# Patient Record
Sex: MALE | Race: WHITE | NOT HISPANIC OR LATINO | Employment: FULL TIME | URBAN - METROPOLITAN AREA
[De-identification: names, ages, dates, MRNs, and addresses within clinical notes are randomized per-mention and may not be internally consistent; named-entity substitution may affect disease eponyms.]

---

## 2023-07-31 ENCOUNTER — OFFICE VISIT (OUTPATIENT)
Dept: PRIMARY CARE | Facility: CLINIC | Age: 68
End: 2023-07-31
Payer: COMMERCIAL

## 2023-07-31 VITALS
BODY MASS INDEX: 32.43 KG/M2 | OXYGEN SATURATION: 96 % | HEART RATE: 81 BPM | DIASTOLIC BLOOD PRESSURE: 80 MMHG | WEIGHT: 214 LBS | HEIGHT: 68 IN | TEMPERATURE: 96.7 F | SYSTOLIC BLOOD PRESSURE: 119 MMHG

## 2023-07-31 DIAGNOSIS — I65.23 BILATERAL CAROTID ARTERY STENOSIS: ICD-10-CM

## 2023-07-31 DIAGNOSIS — E78.2 MIXED HYPERLIPIDEMIA: ICD-10-CM

## 2023-07-31 DIAGNOSIS — E11.59 TYPE 2 DIABETES MELLITUS WITH OTHER CIRCULATORY COMPLICATION, WITHOUT LONG-TERM CURRENT USE OF INSULIN (MULTI): Primary | Chronic | ICD-10-CM

## 2023-07-31 PROBLEM — I25.10 3-VESSEL CAD: Status: ACTIVE | Noted: 2023-07-31

## 2023-07-31 PROBLEM — I10 BENIGN ESSENTIAL HYPERTENSION: Status: ACTIVE | Noted: 2023-07-31

## 2023-07-31 PROBLEM — I77.9 CAROTID ARTERY DISEASE (CMS-HCC): Status: ACTIVE | Noted: 2023-07-31

## 2023-07-31 PROCEDURE — 1159F MED LIST DOCD IN RCRD: CPT | Performed by: FAMILY MEDICINE

## 2023-07-31 PROCEDURE — 3079F DIAST BP 80-89 MM HG: CPT | Performed by: FAMILY MEDICINE

## 2023-07-31 PROCEDURE — 3074F SYST BP LT 130 MM HG: CPT | Performed by: FAMILY MEDICINE

## 2023-07-31 PROCEDURE — 1160F RVW MEDS BY RX/DR IN RCRD: CPT | Performed by: FAMILY MEDICINE

## 2023-07-31 PROCEDURE — 1036F TOBACCO NON-USER: CPT | Performed by: FAMILY MEDICINE

## 2023-07-31 PROCEDURE — 4010F ACE/ARB THERAPY RXD/TAKEN: CPT | Performed by: FAMILY MEDICINE

## 2023-07-31 PROCEDURE — 99214 OFFICE O/P EST MOD 30 MIN: CPT | Performed by: FAMILY MEDICINE

## 2023-07-31 RX ORDER — OMEPRAZOLE 40 MG/1
CAPSULE, DELAYED RELEASE ORAL
COMMUNITY
Start: 2017-02-14 | End: 2023-09-05 | Stop reason: SDUPTHER

## 2023-07-31 RX ORDER — MELOXICAM 15 MG/1
TABLET ORAL
COMMUNITY
Start: 2015-04-28 | End: 2023-09-15 | Stop reason: SDUPTHER

## 2023-07-31 RX ORDER — CHLORTHALIDONE 25 MG/1
1 TABLET ORAL DAILY
COMMUNITY
Start: 2015-06-24 | End: 2024-03-19 | Stop reason: SDUPTHER

## 2023-07-31 RX ORDER — TELMISARTAN 80 MG/1
80 TABLET ORAL DAILY
COMMUNITY
Start: 2015-06-24 | End: 2024-04-16 | Stop reason: SDUPTHER

## 2023-07-31 RX ORDER — TRIAMCINOLONE ACETONIDE 5 MG/G
CREAM TOPICAL
COMMUNITY
Start: 2018-09-05 | End: 2023-11-15 | Stop reason: ALTCHOICE

## 2023-07-31 RX ORDER — MULTIVIT WITH IRON,MINERALS
1 TABLET,CHEWABLE ORAL 2 TIMES DAILY
COMMUNITY

## 2023-07-31 RX ORDER — ATORVASTATIN CALCIUM 80 MG/1
80 TABLET, FILM COATED ORAL DAILY
COMMUNITY
Start: 2015-04-28 | End: 2024-03-19 | Stop reason: SDUPTHER

## 2023-07-31 RX ORDER — CLOPIDOGREL BISULFATE 75 MG/1
1 TABLET ORAL DAILY
COMMUNITY
Start: 2022-04-12 | End: 2023-10-24 | Stop reason: SINTOL

## 2023-07-31 RX ORDER — SEMAGLUTIDE 0.68 MG/ML
0.5 INJECTION, SOLUTION SUBCUTANEOUS
Qty: 9 ML | Refills: 1 | Status: SHIPPED | OUTPATIENT
Start: 2023-07-31 | End: 2023-11-15 | Stop reason: SDUPTHER

## 2023-07-31 RX ORDER — SEMAGLUTIDE 0.68 MG/ML
INJECTION, SOLUTION SUBCUTANEOUS
COMMUNITY
Start: 2023-06-17 | End: 2023-07-31 | Stop reason: SDUPTHER

## 2023-07-31 RX ORDER — METFORMIN HYDROCHLORIDE 1000 MG/1
1000 TABLET ORAL 2 TIMES DAILY
COMMUNITY
Start: 2017-01-16 | End: 2024-03-19 | Stop reason: SDUPTHER

## 2023-07-31 RX ORDER — ICOSAPENT ETHYL 1 G/1
2 CAPSULE ORAL
Qty: 360 CAPSULE | Refills: 1 | Status: SHIPPED | OUTPATIENT
Start: 2023-07-31 | End: 2023-11-15

## 2023-07-31 RX ORDER — ASPIRIN 81 MG/1
1 TABLET ORAL DAILY
COMMUNITY
Start: 2012-08-15

## 2023-07-31 RX ORDER — CHOLECALCIFEROL (VITAMIN D3) 50 MCG
TABLET ORAL
COMMUNITY

## 2023-07-31 RX ORDER — NAPROXEN SODIUM 220 MG/1
1 TABLET ORAL 2 TIMES DAILY
COMMUNITY

## 2023-07-31 ASSESSMENT — LIFESTYLE VARIABLES
HOW OFTEN DO YOU HAVE SIX OR MORE DRINKS ON ONE OCCASION: NEVER
AUDIT-C TOTAL SCORE: 0
SKIP TO QUESTIONS 9-10: 1
HOW MANY STANDARD DRINKS CONTAINING ALCOHOL DO YOU HAVE ON A TYPICAL DAY: PATIENT DOES NOT DRINK
HOW OFTEN DO YOU HAVE A DRINK CONTAINING ALCOHOL: NEVER

## 2023-07-31 ASSESSMENT — PATIENT HEALTH QUESTIONNAIRE - PHQ9
SUM OF ALL RESPONSES TO PHQ9 QUESTIONS 1 & 2: 0
2. FEELING DOWN, DEPRESSED OR HOPELESS: NOT AT ALL
1. LITTLE INTEREST OR PLEASURE IN DOING THINGS: NOT AT ALL

## 2023-07-31 NOTE — PROGRESS NOTES
"Here for fu visit re: carotid stenosisi DM HLD    compliant with meds     tolerating meds     monitoring BP at home : BP ,130/80     denies chest pain SOB DOOLEY diaphoresis nausea palpitations syncope presyncope orthopnea edema leg pain dysarthria aphasia focal weakness headache numbness tingling  visual disturbance gait disturbance polydipsia polyuria weight loss tremor epistaxis melena rectal bleeding tremor fatigue weight change temperature intolerance.        /80   Pulse 81   Temp 35.9 °C (96.7 °F)   Ht 1.727 m (5' 8\")   Wt 97.1 kg (214 lb)   SpO2 96%   BMI 32.54 kg/m²       Appears comfortable  No retractions  Skin without pallor petechia icterus cyanosis  Neck without JVD thyromegaly bruits  Chest clear to auscultation without rales rhonchi wheeze  Heart regular rate and rhythm without murmur  Abdomen soft nondistended nontender without organomegaly or mass  Extremities without erythema edema Homans or cord  Peripheral pulses palpable  Neuro intact  feet warm without pallor ulcerations erythema  Dorsalis pedis pulses palpable  Sensate to microfilament bilaterally        "

## 2023-08-09 ENCOUNTER — TELEPHONE (OUTPATIENT)
Dept: PRIMARY CARE | Facility: CLINIC | Age: 68
End: 2023-08-09

## 2023-08-09 NOTE — TELEPHONE ENCOUNTER
Patient called saying that [facility] told him he needs a prior authorization for his icosapent ethyl 1 gram capsule.

## 2023-08-15 RX ORDER — OMEPRAZOLE 40 MG/1
40 CAPSULE, DELAYED RELEASE ORAL
Qty: 90 CAPSULE | Refills: 1 | Status: CANCELLED | OUTPATIENT
Start: 2023-08-15

## 2023-08-29 PROBLEM — M19.90 OSTEOARTHRITIS: Status: ACTIVE | Noted: 2023-08-29

## 2023-08-31 ENCOUNTER — TELEPHONE (OUTPATIENT)
Dept: PRIMARY CARE | Facility: CLINIC | Age: 68
End: 2023-08-31
Payer: COMMERCIAL

## 2023-08-31 NOTE — TELEPHONE ENCOUNTER
Just making sure this patients meloxicam med was called in, we spoke about it on 08/29. He said he takes it for Osteoarthritis.

## 2023-09-01 NOTE — TELEPHONE ENCOUNTER
Rx Refill Request Telephone Encounter    Name:  Calvin Figueroa  :  [id number]  Medication Name:  Omeprazole 40 mg capsule  Dose :    Route : oral  Frequency :    Quantity :    Directions : Take by mouth  Specific Pharmacy location:  [facility]   Date of last appointment:  23  Date of next appointment:  11/15/23  Best number to reach patient:

## 2023-09-05 DIAGNOSIS — K21.9 GASTROESOPHAGEAL REFLUX DISEASE WITHOUT ESOPHAGITIS: Primary | ICD-10-CM

## 2023-09-05 RX ORDER — OMEPRAZOLE 40 MG/1
40 CAPSULE, DELAYED RELEASE ORAL
Qty: 90 CAPSULE | Refills: 0 | Status: SHIPPED | OUTPATIENT
Start: 2023-09-05 | End: 2023-09-15 | Stop reason: SDUPTHER

## 2023-09-15 DIAGNOSIS — M19.90 OSTEOARTHRITIS, UNSPECIFIED OSTEOARTHRITIS TYPE, UNSPECIFIED SITE: Primary | ICD-10-CM

## 2023-09-15 DIAGNOSIS — K21.9 GASTROESOPHAGEAL REFLUX DISEASE WITHOUT ESOPHAGITIS: ICD-10-CM

## 2023-09-15 DIAGNOSIS — M19.90 OSTEOARTHRITIS, UNSPECIFIED OSTEOARTHRITIS TYPE, UNSPECIFIED SITE: ICD-10-CM

## 2023-09-15 RX ORDER — MELOXICAM 15 MG/1
15 TABLET ORAL DAILY
Qty: 90 TABLET | Refills: 0 | Status: SHIPPED | OUTPATIENT
Start: 2023-09-15 | End: 2023-11-27

## 2023-09-15 RX ORDER — OMEPRAZOLE 40 MG/1
40 CAPSULE, DELAYED RELEASE ORAL
Qty: 90 CAPSULE | Refills: 0 | Status: SHIPPED | OUTPATIENT
Start: 2023-09-15 | End: 2024-04-16 | Stop reason: SDUPTHER

## 2023-09-15 RX ORDER — MELOXICAM 15 MG/1
15 TABLET ORAL DAILY
Qty: 90 TABLET | Refills: 0 | Status: SHIPPED | OUTPATIENT
Start: 2023-09-15 | End: 2023-09-15 | Stop reason: SDUPTHER

## 2023-09-15 NOTE — TELEPHONE ENCOUNTER
Rx Refill Request Telephone Encounter    Name:  Calvin Figueroa  :  [id number]  Medication Name:  Meloxicam   Dose : 15 mg   Route :    Frequency : 1 a day    Quantity : 90  Directions :    Specific Pharmacy location:  [facility]   Date of last appointment:  23  Date of next appointment:    Best number to reach patient:  [phone]      Rx Refill Request Telephone Encounter    Name:  Calvin Figueroa  :  [id number]  Medication Name:  Omeprazole  Dose : 40 mg   Route :    Frequency : 1 caps by mouth once daily  Quantity : 90  Directions :    Specific Pharmacy location:  [facility]   Date of last appointment:  23   Date of next appointment:    Best number to reach patient:  [phone]

## 2023-09-24 PROBLEM — E11.40 DIABETIC NEUROPATHY (MULTI): Status: ACTIVE | Noted: 2023-09-24

## 2023-09-24 PROBLEM — R53.82 CHRONIC FATIGUE: Status: ACTIVE | Noted: 2023-09-24

## 2023-09-24 PROBLEM — L30.9 ECZEMA: Status: ACTIVE | Noted: 2023-09-24

## 2023-09-24 PROBLEM — E03.9 HYPOTHYROIDISM: Status: ACTIVE | Noted: 2023-09-24

## 2023-09-24 PROBLEM — M23.303 DERANGEMENT OF MEDIAL MENISCUS OF RIGHT KNEE: Status: ACTIVE | Noted: 2023-09-24

## 2023-09-24 PROBLEM — I65.23 ARTERIOSCLEROSIS OF CAROTID ARTERY, BILATERAL: Status: ACTIVE | Noted: 2023-09-24

## 2023-09-24 PROBLEM — E66.811 CLASS 1 OBESITY WITH BODY MASS INDEX (BMI) OF 32.0 TO 32.9 IN ADULT: Status: ACTIVE | Noted: 2023-09-24

## 2023-09-24 PROBLEM — E66.9 CLASS 1 OBESITY WITH BODY MASS INDEX (BMI) OF 32.0 TO 32.9 IN ADULT: Status: ACTIVE | Noted: 2023-09-24

## 2023-09-24 PROBLEM — R06.09 EXERTIONAL DYSPNEA: Status: ACTIVE | Noted: 2023-09-24

## 2023-09-24 PROBLEM — G89.29 CHRONIC LEFT HIP PAIN: Status: ACTIVE | Noted: 2023-09-24

## 2023-09-24 PROBLEM — H91.90 DECREASED HEARING: Status: ACTIVE | Noted: 2023-09-24

## 2023-09-24 PROBLEM — H93.13 TINNITUS OF BOTH EARS: Status: ACTIVE | Noted: 2023-09-24

## 2023-09-24 PROBLEM — M25.552 CHRONIC LEFT HIP PAIN: Status: ACTIVE | Noted: 2023-09-24

## 2023-09-24 PROBLEM — H90.3 HEARING LOSS, SENSORINEURAL, ASYMMETRICAL: Status: ACTIVE | Noted: 2023-09-24

## 2023-09-24 RX ORDER — IBUPROFEN 200 MG
CAPSULE ORAL
COMMUNITY
Start: 2020-03-18

## 2023-09-24 RX ORDER — NIACIN 500 MG/1
1 TABLET, EXTENDED RELEASE ORAL DAILY
COMMUNITY
Start: 2015-06-12 | End: 2023-11-15 | Stop reason: ALTCHOICE

## 2023-09-24 RX ORDER — DOCUSATE SODIUM 100 MG/1
100 CAPSULE, LIQUID FILLED ORAL 2 TIMES DAILY PRN
COMMUNITY
Start: 2010-12-10 | End: 2023-11-15 | Stop reason: ALTCHOICE

## 2023-09-24 RX ORDER — FENOFIBRATE 54 MG/1
TABLET ORAL
COMMUNITY
Start: 2013-02-19 | End: 2023-11-15 | Stop reason: ALTCHOICE

## 2023-09-24 RX ORDER — NIACIN 1000 MG
TABLET, EXTENDED RELEASE ORAL
COMMUNITY
Start: 2014-11-14 | End: 2023-11-15 | Stop reason: ALTCHOICE

## 2023-09-24 RX ORDER — MECLIZINE HCL 12.5 MG 12.5 MG/1
TABLET ORAL 3 TIMES DAILY PRN
COMMUNITY
Start: 2022-03-10

## 2023-09-24 RX ORDER — OMEPRAZOLE 20 MG/1
20 CAPSULE, DELAYED RELEASE ORAL DAILY
COMMUNITY
Start: 2013-07-08 | End: 2024-04-16 | Stop reason: WASHOUT

## 2023-09-24 RX ORDER — ZINC GLUCONATE 50 MG
1 TABLET ORAL DAILY
COMMUNITY
End: 2023-11-15 | Stop reason: ALTCHOICE

## 2023-09-24 RX ORDER — FAMOTIDINE 20 MG/1
20 TABLET, FILM COATED ORAL NIGHTLY PRN
COMMUNITY
Start: 2022-02-25 | End: 2023-11-15 | Stop reason: ALTCHOICE

## 2023-09-24 RX ORDER — TRAMADOL HYDROCHLORIDE 50 MG/1
50 TABLET ORAL EVERY 6 HOURS PRN
COMMUNITY
Start: 2010-12-09 | End: 2023-11-15 | Stop reason: ALTCHOICE

## 2023-09-24 RX ORDER — LANCETS
EACH MISCELLANEOUS
COMMUNITY

## 2023-09-24 RX ORDER — LEVOTHYROXINE SODIUM 75 UG/1
75 TABLET ORAL DAILY
COMMUNITY
End: 2023-11-15 | Stop reason: ALTCHOICE

## 2023-09-24 RX ORDER — DIAPER,BRIEF,ADULT, DISPOSABLE
1 EACH MISCELLANEOUS DAILY
COMMUNITY

## 2023-10-17 ENCOUNTER — HOSPITAL ENCOUNTER (OUTPATIENT)
Dept: VASCULAR MEDICINE | Facility: HOSPITAL | Age: 68
Discharge: HOME | End: 2023-10-17
Payer: COMMERCIAL

## 2023-10-17 DIAGNOSIS — I65.23 OCCLUSION AND STENOSIS OF BILATERAL CAROTID ARTERIES: ICD-10-CM

## 2023-10-17 PROCEDURE — 93880 EXTRACRANIAL BILAT STUDY: CPT | Performed by: SURGERY

## 2023-10-17 PROCEDURE — 93880 EXTRACRANIAL BILAT STUDY: CPT

## 2023-10-24 ENCOUNTER — OFFICE VISIT (OUTPATIENT)
Dept: VASCULAR SURGERY | Facility: CLINIC | Age: 68
End: 2023-10-24
Payer: COMMERCIAL

## 2023-10-24 VITALS
HEIGHT: 68 IN | WEIGHT: 208 LBS | DIASTOLIC BLOOD PRESSURE: 68 MMHG | OXYGEN SATURATION: 95 % | BODY MASS INDEX: 31.52 KG/M2 | HEART RATE: 83 BPM | SYSTOLIC BLOOD PRESSURE: 120 MMHG

## 2023-10-24 DIAGNOSIS — I65.23 BILATERAL CAROTID ARTERY STENOSIS: ICD-10-CM

## 2023-10-24 PROCEDURE — 3074F SYST BP LT 130 MM HG: CPT | Performed by: SURGERY

## 2023-10-24 PROCEDURE — 4010F ACE/ARB THERAPY RXD/TAKEN: CPT | Performed by: SURGERY

## 2023-10-24 PROCEDURE — 3078F DIAST BP <80 MM HG: CPT | Performed by: SURGERY

## 2023-10-24 PROCEDURE — 1036F TOBACCO NON-USER: CPT | Performed by: SURGERY

## 2023-10-24 PROCEDURE — 1160F RVW MEDS BY RX/DR IN RCRD: CPT | Performed by: SURGERY

## 2023-10-24 PROCEDURE — 99213 OFFICE O/P EST LOW 20 MIN: CPT | Performed by: SURGERY

## 2023-10-24 PROCEDURE — 1159F MED LIST DOCD IN RCRD: CPT | Performed by: SURGERY

## 2023-10-24 ASSESSMENT — ENCOUNTER SYMPTOMS
ENDOCRINE NEGATIVE: 1
CONSTITUTIONAL NEGATIVE: 1
NEUROLOGICAL NEGATIVE: 1
PSYCHIATRIC NEGATIVE: 1
BRUISES/BLEEDS EASILY: 1
RESPIRATORY NEGATIVE: 1
CARDIOVASCULAR NEGATIVE: 1
ALLERGIC/IMMUNOLOGIC NEGATIVE: 1
GASTROINTESTINAL NEGATIVE: 1
MUSCULOSKELETAL NEGATIVE: 1

## 2023-10-24 NOTE — PROGRESS NOTES
History Of Present Illness  Calvin Figueroa is a 68 y.o. male presenting for carotid follow up. He has history of left CEA 1 1/2 years ago. He denies any new lateralizing symptoms since his last visit. He continues to take aspirin 3x a week, along with Plavix and atorvastatin daily. He states he only takes the aspirin 3x a week due to significant issues with bruising and bleeding. His most recent carotid duplex reveals less than 50% ICA stenosis bilaterally.     Past Medical History  He has a past medical history of Chronic fatigue, unspecified (05/12/2022), Hypothyroidism, unspecified (05/18/2022), Personal history of other diseases of the circulatory system, Personal history of other diseases of the digestive system (01/22/2016), and Unspecified hearing loss, unspecified ear (01/28/2022).    Surgical History  He has a past surgical history that includes MR angio head wo IV contrast (3/9/2022); MR angio neck wo IV contrast (3/9/2022); CT angio head w and wo IV contrast (3/9/2022); CT angio neck w and wo IV contrast (3/9/2022); CT angio head w and wo IV contrast (10/21/2022); and CT angio neck w and wo IV contrast (10/21/2022).     Social History  He reports that he has never smoked. He has never used smokeless tobacco. No history on file for alcohol use and drug use.    Family History  Family History   Problem Relation Name Age of Onset    Heart disease Mother      Diabetes Mother      Hypertension Mother      Hyperlipidemia Father      Cancer Father      Hypertension Father      Rectal cancer Paternal Grandmother      Other (Cardiac disorder) Other Other     Diabetes Other Other     Hypertension Other Other     Lung cancer Other Other         Allergies  Patient has no known allergies.    Review of Systems   Constitutional: Negative.    HENT: Negative.     Respiratory: Negative.     Cardiovascular: Negative.    Gastrointestinal: Negative.    Endocrine: Negative.    Genitourinary: Negative.    Musculoskeletal:  "Negative.    Allergic/Immunologic: Negative.    Neurological: Negative.    Hematological:  Bruises/bleeds easily.   Psychiatric/Behavioral: Negative.          Physical Exam  Vitals reviewed.   Constitutional:       General: He is not in acute distress.     Appearance: Normal appearance. He is normal weight.   HENT:      Head: Normocephalic and atraumatic.   Eyes:      Extraocular Movements: Extraocular movements intact.      Conjunctiva/sclera: Conjunctivae normal.      Pupils: Pupils are equal, round, and reactive to light.   Neck:      Vascular: No carotid bruit.   Cardiovascular:      Rate and Rhythm: Normal rate and regular rhythm.      Pulses:           Radial pulses are 2+ on the right side and 2+ on the left side.      Heart sounds: Normal heart sounds.   Pulmonary:      Effort: Pulmonary effort is normal.      Breath sounds: Normal breath sounds.   Abdominal:      General: Abdomen is flat. Bowel sounds are normal.      Palpations: Abdomen is soft.   Musculoskeletal:         General: No swelling or tenderness. Normal range of motion.      Cervical back: Normal range of motion and neck supple. No tenderness.   Skin:     General: Skin is warm and dry.      Capillary Refill: Capillary refill takes less than 2 seconds.   Neurological:      General: No focal deficit present.      Mental Status: He is alert and oriented to person, place, and time.      Cranial Nerves: No cranial nerve deficit.      Sensory: No sensory deficit.      Motor: No weakness.   Psychiatric:         Mood and Affect: Mood normal.         Behavior: Behavior normal.          Last Recorded Vitals  Blood pressure 120/68, pulse 83, height 1.727 m (5' 8\"), weight 94.3 kg (208 lb), SpO2 95 %.    Relevant Results      Current Outpatient Medications:     aspirin 81 mg EC tablet, Take 1 tablet (81 mg) by mouth once daily., Disp: , Rfl:     atorvastatin (Lipitor) 80 mg tablet, Take 1 tablet (80 mg) by mouth once daily., Disp: , Rfl:     blood sugar " diagnostic (Blood Glucose Test) strip, livongo -uses there test strips and lancets and meter., Disp: , Rfl:     chlorthalidone (Hygroton) 25 mg tablet, Take 1 tablet (25 mg) by mouth once daily., Disp: , Rfl:     cholecalciferol (Vitamin D-3) 50 MCG (2000 UT) tablet, Take by mouth., Disp: , Rfl:     docusate sodium (Colace) 100 mg capsule, Take 1 capsule (100 mg) by mouth 2 times a day as needed for constipation (over the counter)., Disp: , Rfl:     empagliflozin (Jardiance) 10 mg, Take 1 tablet (10 mg) by mouth once daily., Disp: , Rfl:     evolocumab (Repatha SureClick) 140 mg/mL injection, Inject 1 mL (140 mg) under the skin every 14 (fourteen) days., Disp: , Rfl:     famotidine (Pepcid) 20 mg tablet, Take 1 tablet (20 mg) by mouth as needed at bedtime (for breakthrough heartburn.)., Disp: , Rfl:     fenofibrate (Tricor) 54 mg tablet, None Entered, Disp: , Rfl:     glucosamine-chondroitin (Osteo Bi-Flex) 250-200 mg tablet, Take 1 tablet by mouth twice a day., Disp: , Rfl:     icosapent ethyL (Vascepa) 1 gram capsule, Take 2 capsules (2 g) by mouth 2 times a day with meals., Disp: 360 capsule, Rfl: 1    lancets misc, molly uses there test strips and lancets and meter., Disp: , Rfl:     lecithin, soy 1,200 mg capsule, Take 1 capsule by mouth once daily., Disp: , Rfl:     levothyroxine (Synthroid, Levoxyl) 75 mcg tablet, Take 1 tablet (75 mcg) by mouth once daily., Disp: , Rfl:     meclizine (Antivert) 12.5 mg tablet, Take by mouth 3 times a day as needed., Disp: , Rfl:     meloxicam (Mobic) 15 mg tablet, Take 1 tablet (15 mg) by mouth once daily., Disp: 90 tablet, Rfl: 0    mesalamine (Pentasa) 250 mg ER capsule, Take 3 capsules (750 mg) by mouth 2 times a day. (PLEASE REORDER 2 BUSINESS DAYS IN ADVANCE), Disp: , Rfl:     metFORMIN (Glucophage) 1,000 mg tablet, Take 1 tablet (1,000 mg) by mouth 2 times a day., Disp: , Rfl:     multivit-minerals/folic acid (ONE-A-DAY MEN VITACRAVES ORAL), Take 1 tablet by mouth  once daily. One A Day Mens Vitacraves Multi Gummies, Disp: , Rfl:     niacin 1,000 mg ER tablet, , Disp: , Rfl:     niacin 500 mg ER tablet, Take 1 tablet (500 mg) by mouth once daily., Disp: , Rfl:     omega 3-dha-epa-fish oil (Sea-Omega 30) 1,200 (144-216) mg capsule, Take 1 capsule (1,200 mg) by mouth twice a day., Disp: , Rfl:     omeprazole (PriLOSEC) 20 mg DR capsule, Take 1 capsule (20 mg) by mouth once daily. FOR STOMACH, Disp: , Rfl:     omeprazole (PriLOSEC) 40 mg DR capsule, Take 1 capsule (40 mg) by mouth once daily in the morning. Take before meals., Disp: 90 capsule, Rfl: 0    ONE DAILY MULTIVITAMIN ORAL, One-Daily Multi Vitamins Oral Tablet  Refills: 0     Active, Disp: , Rfl:     Ozempic 0.25 mg or 0.5 mg (2 mg/3 mL) pen injector, Inject 0.5 mg under the skin 1 (one) time per week. (Patient taking differently: Inject 0.25 mg under the skin 1 (one) time per week.), Disp: 9 mL, Rfl: 1    telmisartan (MIcarDIS) 80 mg tablet, Take 1 tablet (80 mg) by mouth once daily., Disp: , Rfl:     traMADol (Ultram) 50 mg tablet, Take 1 tablet (50 mg) by mouth every 6 hours if needed., Disp: , Rfl:     triamcinolone (Kenalog) 0.5 % cream, APPLY SPARINGLY TO AFFECTED AREA(S) 2 TO 3 TIMES DAILY., Disp: , Rfl:     zinc acetate 50 mg (zinc) capsule, Take by mouth., Disp: , Rfl:     zinc gluconate 50 mg tablet, Take 1 tablet (50 mg) by mouth once daily., Disp: , Rfl:      Vascular US carotid artery duplex bilateral    Result Date: 10/17/2023           [facility] [address] [place] [address] Tel [phone] and Fax [phone]  Vascular Lab Report Carotid Artery Duplex Ultrasound  Patient Name:     FABIEN Hook Physician: [id number] Matson MD Study Date:       10/17/2023          Ordering Provider: [id number] KEYONA MATSON MRN/PID:          [id number]            Technologist:       [facility] Accession#:       [id number]        Technologist 2: Date of           1955 / 68      Encounter#:        [id number] Birth/Age:        years Gender:           M Admission Status: Outpatient          Location           [facility]                                       Performed:  Diagnosis/ICD: Occlusion and stenosis of bilateral carotid arteries-I65.23 Indication:    Occlusion/stenosis, Carotid without cerebral infarction  CONCLUSIONS: Right Carotid: Findings are consistent with less than 50% stenosis of the right proximal internal carotid artery. Laminar flow seen by color Doppler. Right external carotid artery appears patent with no evidence of stenosis. No evidence of hemodynamically significant stenosis of the right common carotid artery. The right vertebral artery is patent with antegrade flow. No evidence of hemodynamically significant stenosis in the right subclavian artery. Left Carotid: Findings are consistent with less than 50% stenosis of the left proximal internal carotid artery. Laminar flow seen by color Doppler. There are elevated velocities in the left ECA that are suggestive of disease. No evidence of hemodynamically significant stenosis of the left common carotid artery. The left vertebral artery is patent with antegrade flow. No evidence of hemodynamically significant stenosis in the left subclavian artery. Endarterectomy: Patent left carotid endarterectomy site following endarterectomy.  Comparison: Compared with study from 4/6/2023, no significant change.  Imaging & Doppler Findings: Right Plaque Morph: The proximal right internal carotid artery demonstrates smooth, homogenous and heterogenous plaque. The distal right common carotid artery demonstrates smooth and homogenous plaque. Left Plaque Morph: The proximal left external carotid artery demonstrates irregular and heterogenous plaque. The mid left common carotid artery demonstrates smooth and homogenous  plaque. The distal left common carotid artery demonstrates smooth and homogenous plaque.   Right                       Left   PSV     EDV                PSV      EDV 84 cm/s           CCA P    76 cm/s 66 cm/s           CCA D    60 cm/s 49 cm/s 17 cm/s   ICA P    57 cm/s  15 cm/s 46 cm/s 19 cm/s   ICA M    59 cm/s  24 cm/s 66 cm/s 31 cm/s   ICA D    55 cm/s  23 cm/s 94 cm/s            ECA     254 cm/s 45 cm/s 15 cm/s Vertebral  36 cm/s  11 cm/s 89 cm/s         Subclavian 82 cm/s               Right Left ICA/CCA Ratio  0.8  0.9   [id number] Barbara Nuñez MD Electronically signed by [id number] Barbara Nuñez MD on 10/17/2023 at 1:42:27 PM  ** Final **        Assessment/Plan   Diagnoses and all orders for this visit:  Bilateral carotid artery stenosis  -     Vascular US carotid artery duplex bilateral; Future      67yo male with history of left carotid endarterectomy. He denies any lateralizing symptoms and no focal deficits are noted on exam. I have reviewed his chart and Plavix was started around the time he had his stroke. At this point, now that he is this far out from carotid surgery with normal carotid duplex findings, I have recommended discontinuing Plavix. He should go back to taking aspirin 81 mg everyday. He should also continue atorvastatin. He is to follow up in 6 months with repeat carotid duplex.           Barbara Nuñez MD    Scribe Attestation  By signing my name below, I, Linda Rajput, Scribcady   attest that this documentation has been prepared under the direction and in the presence of Barbara Nuñez MD.

## 2023-10-30 ENCOUNTER — APPOINTMENT (OUTPATIENT)
Dept: PRIMARY CARE | Facility: CLINIC | Age: 68
End: 2023-10-30
Payer: COMMERCIAL

## 2023-11-09 ENCOUNTER — LAB (OUTPATIENT)
Dept: LAB | Facility: LAB | Age: 68
End: 2023-11-09
Payer: COMMERCIAL

## 2023-11-09 DIAGNOSIS — E11.59 TYPE 2 DIABETES MELLITUS WITH OTHER CIRCULATORY COMPLICATIONS (MULTI): Primary | ICD-10-CM

## 2023-11-09 DIAGNOSIS — E11.59 TYPE 2 DIABETES MELLITUS WITH OTHER CIRCULATORY COMPLICATION, WITHOUT LONG-TERM CURRENT USE OF INSULIN (MULTI): Chronic | ICD-10-CM

## 2023-11-09 LAB
ALBUMIN SERPL BCP-MCNC: 4.6 G/DL (ref 3.4–5)
ALP SERPL-CCNC: 55 U/L (ref 33–136)
ALT SERPL W P-5'-P-CCNC: 32 U/L (ref 10–52)
ANION GAP SERPL CALC-SCNC: 15 MMOL/L (ref 10–20)
AST SERPL W P-5'-P-CCNC: 23 U/L (ref 9–39)
BILIRUB SERPL-MCNC: 0.8 MG/DL (ref 0–1.2)
BUN SERPL-MCNC: 19 MG/DL (ref 6–23)
CALCIUM SERPL-MCNC: 9.7 MG/DL (ref 8.6–10.6)
CHLORIDE SERPL-SCNC: 101 MMOL/L (ref 98–107)
CHOLEST SERPL-MCNC: 162 MG/DL (ref 0–199)
CHOLESTEROL/HDL RATIO: 4.1
CO2 SERPL-SCNC: 28 MMOL/L (ref 21–32)
CREAT SERPL-MCNC: 0.96 MG/DL (ref 0.5–1.3)
EST. AVERAGE GLUCOSE BLD GHB EST-MCNC: 128 MG/DL
GFR SERPL CREATININE-BSD FRML MDRD: 87 ML/MIN/1.73M*2
GLUCOSE SERPL-MCNC: 104 MG/DL (ref 74–99)
HBA1C MFR BLD: 6.1 %
HDLC SERPL-MCNC: 39.8 MG/DL
LDLC SERPL CALC-MCNC: 81 MG/DL
NON HDL CHOLESTEROL: 122 MG/DL (ref 0–149)
POTASSIUM SERPL-SCNC: 4.2 MMOL/L (ref 3.5–5.3)
PROT SERPL-MCNC: 7.1 G/DL (ref 6.4–8.2)
SODIUM SERPL-SCNC: 140 MMOL/L (ref 136–145)
TRIGL SERPL-MCNC: 207 MG/DL (ref 0–149)
TSH SERPL-ACNC: 4.69 MIU/L (ref 0.44–3.98)
VIT B12 SERPL-MCNC: 346 PG/ML (ref 211–911)
VLDL: 41 MG/DL (ref 0–40)

## 2023-11-09 PROCEDURE — 80061 LIPID PANEL: CPT

## 2023-11-09 PROCEDURE — 36415 COLL VENOUS BLD VENIPUNCTURE: CPT

## 2023-11-09 PROCEDURE — 82607 VITAMIN B-12: CPT

## 2023-11-09 PROCEDURE — 83036 HEMOGLOBIN GLYCOSYLATED A1C: CPT

## 2023-11-09 PROCEDURE — 84443 ASSAY THYROID STIM HORMONE: CPT

## 2023-11-09 PROCEDURE — 80053 COMPREHEN METABOLIC PANEL: CPT

## 2023-11-15 ENCOUNTER — OFFICE VISIT (OUTPATIENT)
Dept: PRIMARY CARE | Facility: CLINIC | Age: 68
End: 2023-11-15
Payer: COMMERCIAL

## 2023-11-15 VITALS
WEIGHT: 207 LBS | SYSTOLIC BLOOD PRESSURE: 111 MMHG | TEMPERATURE: 97.6 F | HEIGHT: 68 IN | BODY MASS INDEX: 31.37 KG/M2 | OXYGEN SATURATION: 96 % | HEART RATE: 79 BPM | DIASTOLIC BLOOD PRESSURE: 76 MMHG

## 2023-11-15 DIAGNOSIS — E11.59 TYPE 2 DIABETES MELLITUS WITH OTHER CIRCULATORY COMPLICATION, WITHOUT LONG-TERM CURRENT USE OF INSULIN (MULTI): Primary | Chronic | ICD-10-CM

## 2023-11-15 DIAGNOSIS — I10 PRIMARY HYPERTENSION: ICD-10-CM

## 2023-11-15 DIAGNOSIS — E78.2 MIXED HYPERLIPIDEMIA: ICD-10-CM

## 2023-11-15 PROCEDURE — 99214 OFFICE O/P EST MOD 30 MIN: CPT | Performed by: FAMILY MEDICINE

## 2023-11-15 PROCEDURE — 1160F RVW MEDS BY RX/DR IN RCRD: CPT | Performed by: FAMILY MEDICINE

## 2023-11-15 PROCEDURE — 3078F DIAST BP <80 MM HG: CPT | Performed by: FAMILY MEDICINE

## 2023-11-15 PROCEDURE — 3074F SYST BP LT 130 MM HG: CPT | Performed by: FAMILY MEDICINE

## 2023-11-15 PROCEDURE — 1036F TOBACCO NON-USER: CPT | Performed by: FAMILY MEDICINE

## 2023-11-15 PROCEDURE — 3048F LDL-C <100 MG/DL: CPT | Performed by: FAMILY MEDICINE

## 2023-11-15 PROCEDURE — 4010F ACE/ARB THERAPY RXD/TAKEN: CPT | Performed by: FAMILY MEDICINE

## 2023-11-15 PROCEDURE — 1159F MED LIST DOCD IN RCRD: CPT | Performed by: FAMILY MEDICINE

## 2023-11-15 PROCEDURE — 3044F HG A1C LEVEL LT 7.0%: CPT | Performed by: FAMILY MEDICINE

## 2023-11-15 RX ORDER — SEMAGLUTIDE 0.68 MG/ML
0.5 INJECTION, SOLUTION SUBCUTANEOUS
Qty: 9 ML | Refills: 1 | Status: SHIPPED
Start: 2023-11-15 | End: 2023-12-13 | Stop reason: SDUPTHER

## 2023-11-15 ASSESSMENT — COLUMBIA-SUICIDE SEVERITY RATING SCALE - C-SSRS
6. HAVE YOU EVER DONE ANYTHING, STARTED TO DO ANYTHING, OR PREPARED TO DO ANYTHING TO END YOUR LIFE?: NO
1. IN THE PAST MONTH, HAVE YOU WISHED YOU WERE DEAD OR WISHED YOU COULD GO TO SLEEP AND NOT WAKE UP?: NO
2. HAVE YOU ACTUALLY HAD ANY THOUGHTS OF KILLING YOURSELF?: NO

## 2023-11-15 ASSESSMENT — PATIENT HEALTH QUESTIONNAIRE - PHQ9
1. LITTLE INTEREST OR PLEASURE IN DOING THINGS: NOT AT ALL
2. FEELING DOWN, DEPRESSED OR HOPELESS: NOT AT ALL
SUM OF ALL RESPONSES TO PHQ9 QUESTIONS 1 & 2: 0

## 2023-11-15 NOTE — PROGRESS NOTES
"Here for fu visit re: DM HTN    compliant with meds     tolerating meds    not monitoring BP at home :     denies chest pain SOB DOOLEY diaphoresis nausea palpitations syncope presyncope orthopnea edema leg pain dysarthria aphasia focal weakness headache numbness tingling  visual disturbance gait disturbance polydipsia polyuria weight loss tremor epistaxis melena rectal bleeding tremor fatigue weight change temperature intolerance.        /76   Pulse 79   Temp 36.4 °C (97.6 °F)   Ht 1.727 m (5' 8\")   Wt 93.9 kg (207 lb)   SpO2 96%   BMI 31.47 kg/m²       Appears comfortable  No retractions  Skin without pallor petechia icterus cyanosis  Neck without JVD thyromegaly bruits  Chest clear to auscultation without rales rhonchi wheeze  Heart regular rate and rhythm without murmur  Abdomen soft nondistended nontender without organomegaly or mass  Extremities without erythema edema Homans or cord  Peripheral pulses palpable  feet warm without pallor ulcerations erythema  Dorsalis pedis pulses palpable  Sensate to microfilament bilaterally        "

## 2023-11-26 DIAGNOSIS — M19.90 OSTEOARTHRITIS, UNSPECIFIED OSTEOARTHRITIS TYPE, UNSPECIFIED SITE: ICD-10-CM

## 2023-11-27 RX ORDER — MELOXICAM 15 MG/1
15 TABLET ORAL DAILY
Qty: 90 TABLET | Refills: 3 | Status: SHIPPED | OUTPATIENT
Start: 2023-11-27

## 2023-12-12 ENCOUNTER — TELEPHONE (OUTPATIENT)
Dept: PRIMARY CARE | Facility: CLINIC | Age: 68
End: 2023-12-12
Payer: COMMERCIAL

## 2023-12-12 DIAGNOSIS — E11.59 TYPE 2 DIABETES MELLITUS WITH OTHER CIRCULATORY COMPLICATION, WITHOUT LONG-TERM CURRENT USE OF INSULIN (MULTI): Chronic | ICD-10-CM

## 2023-12-12 NOTE — TELEPHONE ENCOUNTER
Rx Refill Request Telephone Encounter    Name:  Calvin Figueroa  :  [id number]  Medication Name:  Ozempic 0.25 mg or 0.5 mg (2 mg/ 3mL) pen injector   Dose : 0.5 mg   Route : subcutaneous   Frequency : weekly   Quantity : 9 mL  Directions :  Inject 0.5 mg under the skin 1 (one) time per week.  Specific Pharmacy location:  [facility]  Date of last appointment:  11/15/23  Date of next appointment:  05/15/24  Best number to reach patient:      Patient would like to know if you are able to add more refills ?

## 2023-12-13 RX ORDER — SEMAGLUTIDE 0.68 MG/ML
0.5 INJECTION, SOLUTION SUBCUTANEOUS
Qty: 9 ML | Refills: 1 | Status: SHIPPED | OUTPATIENT
Start: 2023-12-13 | End: 2024-02-02 | Stop reason: SDUPTHER

## 2024-02-02 ENCOUNTER — TELEPHONE (OUTPATIENT)
Dept: PRIMARY CARE | Facility: CLINIC | Age: 69
End: 2024-02-02

## 2024-02-02 DIAGNOSIS — E11.59 TYPE 2 DIABETES MELLITUS WITH OTHER CIRCULATORY COMPLICATION, WITHOUT LONG-TERM CURRENT USE OF INSULIN (MULTI): Chronic | ICD-10-CM

## 2024-02-02 RX ORDER — SEMAGLUTIDE 0.68 MG/ML
0.5 INJECTION, SOLUTION SUBCUTANEOUS
Qty: 6 ML | Refills: 2
Start: 2024-02-02 | End: 2024-02-24 | Stop reason: SDUPTHER

## 2024-02-02 NOTE — TELEPHONE ENCOUNTER
Patient called because he was having trouble with his insurance and [facility]. I called [facility] and the woman I spoke with told me that the insurance will only cover 2 pen injectors at a time. So when It was filled on 12/13/2023 they only sent two and the patient will be out after next Wednesday. I had them fill the refill so he wont miss his next dose. Please advise.

## 2024-02-23 ENCOUNTER — TELEPHONE (OUTPATIENT)
Dept: PRIMARY CARE | Facility: CLINIC | Age: 69
End: 2024-02-23
Payer: COMMERCIAL

## 2024-02-23 NOTE — TELEPHONE ENCOUNTER
Rx Refill Request Telephone Encounter    Name:  Calvin Figueroa  :  [id number]  Medication Name:  Ozempic  Dose : 0.25 or 0.25 mg   Route :    Inject 0.5 mg under the skin 1 (one) time per week. - subcutaneous   Quantity :    Directions :    Specific Pharmacy location:  [facility]   Date of last appointment:    Date of next appointment:    Best number to reach patient:  [phone]

## 2024-02-24 DIAGNOSIS — E11.59 TYPE 2 DIABETES MELLITUS WITH OTHER CIRCULATORY COMPLICATION, WITHOUT LONG-TERM CURRENT USE OF INSULIN (MULTI): Chronic | ICD-10-CM

## 2024-02-24 RX ORDER — SEMAGLUTIDE 0.68 MG/ML
0.5 INJECTION, SOLUTION SUBCUTANEOUS
Qty: 6 ML | Refills: 2 | Status: SHIPPED | OUTPATIENT
Start: 2024-02-24

## 2024-03-04 ENCOUNTER — HOSPITAL ENCOUNTER (EMERGENCY)
Facility: HOSPITAL | Age: 69
Discharge: HOME | End: 2024-03-04
Payer: COMMERCIAL

## 2024-03-04 ENCOUNTER — APPOINTMENT (OUTPATIENT)
Dept: RADIOLOGY | Facility: HOSPITAL | Age: 69
End: 2024-03-04
Payer: COMMERCIAL

## 2024-03-04 VITALS
OXYGEN SATURATION: 95 % | HEIGHT: 68 IN | WEIGHT: 205 LBS | BODY MASS INDEX: 31.07 KG/M2 | RESPIRATION RATE: 16 BRPM | DIASTOLIC BLOOD PRESSURE: 85 MMHG | SYSTOLIC BLOOD PRESSURE: 137 MMHG | HEART RATE: 75 BPM | TEMPERATURE: 98.1 F

## 2024-03-04 DIAGNOSIS — M54.40 ACUTE LEFT-SIDED LOW BACK PAIN WITH SCIATICA, SCIATICA LATERALITY UNSPECIFIED: Primary | ICD-10-CM

## 2024-03-04 PROCEDURE — 93971 EXTREMITY STUDY: CPT | Performed by: RADIOLOGY

## 2024-03-04 PROCEDURE — 99284 EMERGENCY DEPT VISIT MOD MDM: CPT | Mod: 25

## 2024-03-04 PROCEDURE — 72131 CT LUMBAR SPINE W/O DYE: CPT

## 2024-03-04 PROCEDURE — 72131 CT LUMBAR SPINE W/O DYE: CPT | Performed by: STUDENT IN AN ORGANIZED HEALTH CARE EDUCATION/TRAINING PROGRAM

## 2024-03-04 PROCEDURE — 93971 EXTREMITY STUDY: CPT

## 2024-03-04 PROCEDURE — 2500000001 HC RX 250 WO HCPCS SELF ADMINISTERED DRUGS (ALT 637 FOR MEDICARE OP): Performed by: PHYSICIAN ASSISTANT

## 2024-03-04 RX ORDER — LIDOCAINE 50 MG/G
1 PATCH TOPICAL DAILY
Qty: 7 PATCH | Refills: 0 | Status: SHIPPED | OUTPATIENT
Start: 2024-03-04

## 2024-03-04 RX ORDER — OXYCODONE AND ACETAMINOPHEN 5; 325 MG/1; MG/1
1 TABLET ORAL EVERY 6 HOURS PRN
Qty: 12 TABLET | Refills: 0 | Status: SHIPPED | OUTPATIENT
Start: 2024-03-04 | End: 2024-03-07

## 2024-03-04 RX ORDER — OXYCODONE AND ACETAMINOPHEN 5; 325 MG/1; MG/1
1 TABLET ORAL ONCE
Status: COMPLETED | OUTPATIENT
Start: 2024-03-04 | End: 2024-03-04

## 2024-03-04 RX ORDER — TIZANIDINE 2 MG/1
2 TABLET ORAL EVERY 6 HOURS PRN
Qty: 30 TABLET | Refills: 0 | Status: SHIPPED | OUTPATIENT
Start: 2024-03-04 | End: 2024-03-14

## 2024-03-04 RX ADMIN — OXYCODONE HYDROCHLORIDE AND ACETAMINOPHEN 1 TABLET: 5; 325 TABLET ORAL at 19:33

## 2024-03-04 ASSESSMENT — PAIN DESCRIPTION - LOCATION: LOCATION: BACK

## 2024-03-04 ASSESSMENT — COLUMBIA-SUICIDE SEVERITY RATING SCALE - C-SSRS
1. IN THE PAST MONTH, HAVE YOU WISHED YOU WERE DEAD OR WISHED YOU COULD GO TO SLEEP AND NOT WAKE UP?: NO
2. HAVE YOU ACTUALLY HAD ANY THOUGHTS OF KILLING YOURSELF?: NO
6. HAVE YOU EVER DONE ANYTHING, STARTED TO DO ANYTHING, OR PREPARED TO DO ANYTHING TO END YOUR LIFE?: NO

## 2024-03-04 ASSESSMENT — PAIN - FUNCTIONAL ASSESSMENT: PAIN_FUNCTIONAL_ASSESSMENT: 0-10

## 2024-03-04 ASSESSMENT — PAIN SCALES - GENERAL: PAINLEVEL_OUTOF10: 7

## 2024-03-04 ASSESSMENT — PAIN DESCRIPTION - DESCRIPTORS: DESCRIPTORS: ACHING

## 2024-03-04 ASSESSMENT — PAIN DESCRIPTION - ORIENTATION: ORIENTATION: LEFT;LOWER

## 2024-03-04 ASSESSMENT — PAIN DESCRIPTION - PAIN TYPE: TYPE: ACUTE PAIN

## 2024-03-04 NOTE — ED TRIAGE NOTES
PT. C/O LEFT LOWER BACK PAIN SINCE YESTERDAY, WORSE TODAY. PT. STATES ANTERIOR LEFT THIGH FEELS NUMB. PT. TOOK FLEXERIL 10 MG AT 1645. PT. DENIES FALLS OR KNOWN INJURIES. DENIES BLADDER/BOWEL INCONTINENCE.

## 2024-03-05 NOTE — ED PROVIDER NOTES
EMERGENCY MEDICINE EVALUATION NOTE    History of Present Illness     Chief Complaint:   Chief Complaint   Patient presents with    Back Pain     PT. C/O LEFT LOWER BACK PAIN SINCE YESTERDAY, WORSE TODAY. PT. STATES ANTERIOR LEFT THIGH FEELS NUMB. PT. TOOK FLEXERIL 10 MG AT 1645. PT. DENIES FALLS OR KNOWN INJURIES. DENIES BLADDER/BOWEL INCONTINENCE.       HPI: Calvin Figueroa is a 69 y.o. male presents with a chief complaint of lower back pain since injury.  Patient states that his getting worse as he came in today.  Patient states that the anterior portion of the thigh feels like it is asleep and occasionally numb.  Patient will be sent Flexeril from his wife for management of his symptoms.  Patient denies any falls or injuries to the back the patient denies use of any anticoagulation.  Patient denies loss of bowel or bladder function denies any saddle paresthesias.  Wife reports she was reading at home and was concerned that patient had a blood clot due to his complaints.  Patient denies any calf pain, leg swelling, or history of blood clots.  Patient denies any chest pain or shortness of breath.  Patient denies any history of any disc issues with his back.  Patient denies any fevers or chills.  Patient has any history of any IV drug use.    Previous History     Past Medical History:   Diagnosis Date    Chronic fatigue, unspecified 05/12/2022    Chronic fatigue    Diabetes mellitus (CMS/HCC)     High cholesterol     Hypothyroidism, unspecified 05/18/2022    Hypothyroidism    Personal history of other diseases of the circulatory system     History of hypertension    Personal history of other diseases of the digestive system 01/22/2016    History of gastroenteritis    Unspecified hearing loss, unspecified ear 01/28/2022    Decreased hearing     Past Surgical History:   Procedure Laterality Date    CT ANGIO NECK  3/9/2022    CT NECK ANGIO W AND WO IV CONTRAST 3/9/2022 PAR [facility]    CT ANGIO NECK  10/21/2022     CT NECK ANGIO W AND WO IV CONTRAST 10/21/2022 [facility]    CT HEAD ANGIO W AND WO IV CONTRAST  3/9/2022    CT HEAD ANGIO W AND WO IV CONTRAST 3/9/2022 [facility]    CT HEAD ANGIO W AND WO IV CONTRAST  10/21/2022    CT HEAD ANGIO W AND WO IV CONTRAST 10/21/2022 [facility]    MR HEAD ANGIO WO IV CONTRAST  3/9/2022    MR HEAD ANGIO WO IV CONTRAST 3/9/2022 [facility]    MR NECK ANGIO WO IV CONTRAST  3/9/2022    MR NECK ANGIO WO IV CONTRAST 3/9/2022 [facility]     Social History     Tobacco Use    Smoking status: Never    Smokeless tobacco: Never   Vaping Use    Vaping Use: Never used   Substance Use Topics    Alcohol use: Not Currently    Drug use: Never     Family History   Problem Relation Name Age of Onset    Heart disease Mother      Diabetes Mother      Hypertension Mother      Hyperlipidemia Father      Cancer Father      Hypertension Father      Rectal cancer Paternal Grandmother      Other (Cardiac disorder) Other Other     Diabetes Other Other     Hypertension Other Other     Lung cancer Other Other      No Known Allergies  Current Outpatient Medications   Medication Instructions    aspirin 81 mg EC tablet 1 tablet, oral, Daily    atorvastatin (LIPITOR) 80 mg, oral, Daily    blood sugar diagnostic (Blood Glucose Test) strip livongo -uses there test strips and lancets and meter.<BR>    chlorthalidone (Hygroton) 25 mg tablet 1 tablet, oral, Daily    cholecalciferol (Vitamin D-3) 50 MCG (2000 UT) tablet oral    empagliflozin (Jardiance) 10 mg 1 tablet, oral, Daily    glucosamine-chondroitin (Osteo Bi-Flex) 250-200 mg tablet 1 tablet, oral, 2 times daily    lancets misc livongo uses there test strips and lancets and meter.    lecithin, soy 1,200 mg capsule 1 capsule, oral, Daily    lidocaine (Lidoderm) 5 % patch 1 patch, transdermal, Daily, Remove & discard patch within 12 hours or as directed by MD.    meclizine (Antivert) 12.5 mg tablet oral, 3 times daily PRN     meloxicam (MOBIC) 15 mg, oral, Daily    metFORMIN (GLUCOPHAGE) 1,000 mg, oral, 2 times daily    multivit-minerals/folic acid (ONE-A-DAY MEN VITACRAVES ORAL) 1 tablet, oral, Daily, One A Day Mens Vitacraves Multi Gummies    omega 3-dha-epa-fish oil (Sea-Omega 30) 1,200 (144-216) mg capsule 1 capsule, oral, 2 times daily    omeprazole (PRILOSEC) 40 mg, oral, Daily before breakfast    omeprazole (PRILOSEC) 20 mg, oral, Daily, FOR STOMACH    ONE DAILY MULTIVITAMIN ORAL One-Daily Multi Vitamins Oral Tablet   Refills: 0       Active    oxyCODONE-acetaminophen (Percocet) 5-325 mg tablet 1 tablet, oral, Every 6 hours PRN    Ozempic 0.5 mg, subcutaneous, Weekly    telmisartan (MICARDIS) 80 mg, oral, Daily    tiZANidine (ZANAFLEX) 2 mg, oral, Every 6 hours PRN       Physical Exam     Appearance: Alert, oriented , cooperative.  Patient appears to be uncomfortable on examination.     Skin: Intact,  dry skin, no lesions, rash, petechiae or purpura.      Eyes: PERRLA, EOMs intact,  Conjunctiva pink      ENT: Hearing grossly intact.      Neck: Supple. Trachea at midline.      Pulmonary: Clear bilaterally. No rales, rhonchi or wheezing. No accessory muscle use or stridor.     Cardiac: Normal rate and rhythm without murmur     Abdomen: Soft, nontender, active bowel sounds.     Musculoskeletal: Full range of motion.  Equal push and pull in bilateral lower extremities.  Diffuse left-sided lumbar paraspinal tenderness.  Patient does have intact sensation over left thigh but reports that is dull. Neuro vas intact in bilateral lower extremities.     Neurological:Cranial nerves II through XII are grossly intact, normal sensation, no weakness, no focal findings identified.     Results   Labs Reviewed - No data to display  CT lumbar spine wo IV contrast   Final Result   1. Severe multilevel discogenic degenerative changes of the lumbar   spine with variable moderate to severe degrees of lateral recess and   neural foraminal stenosis as  "described above.   2. No acute fracture or traumatic malalignment of the lumbar spine.             MACRO:   None        Signed by: Laura Tariq 3/4/2024 9:11 PM   Dictation workstation:   [id number]      Lower extremity venous duplex left   Final Result   No sonographic evidence for deep vein thrombosis within the evaluated   veins of the left lower extremity.        MACRO:   None        Signed by: Kendrick Hernandez 3/4/2024 8:14 PM   Dictation workstation:   [id number]            ED Course & Medical Decision Making     Medications   oxyCODONE-acetaminophen (Percocet) 5-325 mg per tablet 1 tablet (1 tablet oral Given 3/4/24 1933)     Heart Rate:  [75]   Temperature:  [36.7 °C (98.1 °F)]   Respirations:  [16]   BP: (137)/(85)   Height:  [172.7 cm (5' 8\")]   Weight:  [93 kg (205 lb)]   Pulse Ox:  [95 %]    ED Course as of 03/04/24 2148   Mon Mar 04, 2024   2129 Reevaluated the patient at this time.  Patient states that he feels better after receiving oxycodone in the emergency department.  I did update the patient that his venous duplex was negative of the left lower extremity.  I updated him that he has some osteophyte formation in the posterior spaces of his back which is causing foraminal narrowing which is likely the cause of his symptoms.  Patient does not have any red flag signs or symptoms concerning for cauda equina at this time.  Patient be discharged home at this time he will be given pain medication at home as well as some lidocaine patches and some tizanidine.  Patient was instructed to follow-up with his primary care provider 1 to 2 days and to follow-up with spine surgery number provided.  Patient encouraged to return here immediately with any worsening symptoms. [CJ]      ED Course User Index  [CJ] Gustavo Marc PA-C         Diagnoses as of 03/04/24 2148   Acute left-sided low back pain with sciatica, sciatica laterality unspecified       Procedures   Procedures    Diagnosis     1. Acute left-sided " low back pain with sciatica, sciatica laterality unspecified        Disposition   Discharged    ED Prescriptions       Medication Sig Dispense Start Date End Date Auth. Provider    oxyCODONE-acetaminophen (Percocet) 5-325 mg tablet Take 1 tablet by mouth every 6 hours if needed for severe pain (7 - 10) for up to 3 days. 12 tablet 3/4/2024 3/7/2024 Gustavo Marc PA-C    tiZANidine (Zanaflex) 2 mg tablet Take 1 tablet (2 mg) by mouth every 6 hours if needed for muscle spasms for up to 10 days. 30 tablet 3/4/2024 3/14/2024 Gustavo Marc PA-C    lidocaine (Lidoderm) 5 % patch Place 1 patch over 12 hours on the skin once daily. Remove & discard patch within 12 hours or as directed by MD. 7 patch 3/4/2024 -- Gustavo Marc PA-C            Disclaimer: This note was dictated by speech recognition. Minor errors in transcription may be present. Please call if questions.       Gustavo Marc PA-C  03/04/24 3414

## 2024-03-19 ENCOUNTER — TELEPHONE (OUTPATIENT)
Dept: PRIMARY CARE | Facility: CLINIC | Age: 69
End: 2024-03-19

## 2024-03-19 DIAGNOSIS — E78.2 MIXED HYPERLIPIDEMIA: ICD-10-CM

## 2024-03-19 DIAGNOSIS — E11.9 TYPE 2 DIABETES MELLITUS WITHOUT COMPLICATION, WITHOUT LONG-TERM CURRENT USE OF INSULIN (MULTI): ICD-10-CM

## 2024-03-19 DIAGNOSIS — I10 PRIMARY HYPERTENSION: Primary | ICD-10-CM

## 2024-03-19 DIAGNOSIS — I25.10 3-VESSEL CAD: ICD-10-CM

## 2024-03-19 RX ORDER — METFORMIN HYDROCHLORIDE 1000 MG/1
1000 TABLET ORAL 2 TIMES DAILY
Qty: 180 TABLET | Refills: 0 | Status: SHIPPED | OUTPATIENT
Start: 2024-03-19 | End: 2024-05-14

## 2024-03-19 RX ORDER — ATORVASTATIN CALCIUM 80 MG/1
80 TABLET, FILM COATED ORAL DAILY
Qty: 90 TABLET | Refills: 0 | Status: SHIPPED | OUTPATIENT
Start: 2024-03-19 | End: 2024-05-14

## 2024-03-19 RX ORDER — CHLORTHALIDONE 25 MG/1
25 TABLET ORAL DAILY
Qty: 90 TABLET | Refills: 0 | Status: SHIPPED | OUTPATIENT
Start: 2024-03-19 | End: 2024-05-14

## 2024-03-19 NOTE — TELEPHONE ENCOUNTER
EmpagliflozinRx Refill Request Telephone Encounter    Name:  Calvin Figueroa  :  [id number]  Medication Name:  Empagliflozin  (Jardiance)  Dose : 10 MG  Route : BY MOUTH  Frequency : ONCE DAILY  Quantity : 90  Directions : TAKE ONE TABLET BY MOUTH ONCE DAILY  Specific Pharmacy location:  [facility]  Date of last appointment:    Date of next appointment:  05-  Best number to reach patient:  [phone]    Rx Refill Request Telephone Encounter    Name:  Calvin Figueroa  :  [id number]  Medication Name:  METFORMIN  Dose : 1000 MG  Route : BY MOUTH  Frequency : TWICE DAILY  Quantity : 180  Directions : TAKE ONE TABLET BY MOUTH TWICE DAILY  Specific Pharmacy location:  [facility]    Rx Refill Request Telephone Encounter    Name:  Calvin Figueroa  :  [id number]  Medication Name:  CHLORTHALIDONE   Dose : 25 MG  Route : BY MOUTH  Frequency : ONCE DAILY  Quantity : 90  Directions : TAKE  ONE TABLET BY MOUTH DAILY  Specific Pharmacy location:  [facility]    Rx Refill Request Telephone Encounter    Name:  Calvin Figueroa  :  [id number]  Medication Name:  ATORVASTATIN  Dose : 80 MG  Route : BY MOUTH  Frequency : ONCE DAILY  Quantity : 90  Directions : TAKE ONE TABLET DAILY BY MOUTH

## 2024-04-16 DIAGNOSIS — K21.9 GASTROESOPHAGEAL REFLUX DISEASE WITHOUT ESOPHAGITIS: ICD-10-CM

## 2024-04-16 RX ORDER — TELMISARTAN 80 MG/1
80 TABLET ORAL DAILY
Qty: 90 TABLET | Refills: 1 | Status: SHIPPED | OUTPATIENT
Start: 2024-04-16

## 2024-04-16 RX ORDER — OMEPRAZOLE 40 MG/1
40 CAPSULE, DELAYED RELEASE ORAL
Qty: 90 CAPSULE | Refills: 0 | Status: SHIPPED | OUTPATIENT
Start: 2024-04-16 | End: 2024-06-11

## 2024-04-16 NOTE — TELEPHONE ENCOUNTER
Rx Refill Request Telephone Encounter    Name:  Calvin Figueroa  :  [id number]  Medication Name:  Omeprazole 40 mg DR capsule  Dose : 40 mg  Route : oral  Frequency : daily  Quantity : 90 capsule  Directions :  Take 1 capsule (40 mg) by mouth once daily in the morning. Take before meals.  Rx Refill Request Telephone Encounter    Name:  Calvin Figueroa  :  [id number]  Medication Name:  Telmisartan 80 mg tablet   Dose : 80 tablet   Route : oral  Frequency : daily  Quantity :    Directions : Take 1 tablet (80 mg) by mouth once daily.  Specific Pharmacy location:  [facility]  Date of last appointment:  11/15/23  Date of next appointment:  5/15/24  Best number to reach patient:

## 2024-04-30 ENCOUNTER — APPOINTMENT (OUTPATIENT)
Dept: VASCULAR SURGERY | Facility: CLINIC | Age: 69
End: 2024-04-30
Payer: COMMERCIAL

## 2024-05-09 ENCOUNTER — HOSPITAL ENCOUNTER (OUTPATIENT)
Dept: VASCULAR MEDICINE | Facility: HOSPITAL | Age: 69
Discharge: HOME | End: 2024-05-09
Payer: COMMERCIAL

## 2024-05-09 DIAGNOSIS — I65.23 BILATERAL CAROTID ARTERY STENOSIS: ICD-10-CM

## 2024-05-09 DIAGNOSIS — I65.22 OCCLUSION AND STENOSIS OF LEFT CAROTID ARTERY: ICD-10-CM

## 2024-05-09 PROCEDURE — 93880 EXTRACRANIAL BILAT STUDY: CPT

## 2024-05-09 PROCEDURE — 93880 EXTRACRANIAL BILAT STUDY: CPT | Performed by: SURGERY

## 2024-05-13 DIAGNOSIS — E11.9 TYPE 2 DIABETES MELLITUS WITHOUT COMPLICATION, WITHOUT LONG-TERM CURRENT USE OF INSULIN (MULTI): ICD-10-CM

## 2024-05-13 DIAGNOSIS — I10 PRIMARY HYPERTENSION: ICD-10-CM

## 2024-05-13 DIAGNOSIS — I25.10 3-VESSEL CAD: ICD-10-CM

## 2024-05-13 DIAGNOSIS — E78.2 MIXED HYPERLIPIDEMIA: ICD-10-CM

## 2024-05-14 RX ORDER — CHLORTHALIDONE 25 MG/1
25 TABLET ORAL DAILY
Qty: 90 TABLET | Refills: 1 | Status: SHIPPED | OUTPATIENT
Start: 2024-05-14

## 2024-05-14 RX ORDER — ATORVASTATIN CALCIUM 80 MG/1
80 TABLET, FILM COATED ORAL DAILY
Qty: 90 TABLET | Refills: 1 | Status: SHIPPED | OUTPATIENT
Start: 2024-05-14

## 2024-05-14 RX ORDER — METFORMIN HYDROCHLORIDE 1000 MG/1
1000 TABLET ORAL 2 TIMES DAILY
Qty: 180 TABLET | Refills: 1 | Status: SHIPPED | OUTPATIENT
Start: 2024-05-14

## 2024-05-14 RX ORDER — EMPAGLIFLOZIN 10 MG/1
10 TABLET, FILM COATED ORAL DAILY
Qty: 90 TABLET | Refills: 1 | Status: SHIPPED | OUTPATIENT
Start: 2024-05-14

## 2024-05-15 ENCOUNTER — OFFICE VISIT (OUTPATIENT)
Dept: PRIMARY CARE | Facility: CLINIC | Age: 69
End: 2024-05-15
Payer: COMMERCIAL

## 2024-05-15 VITALS
TEMPERATURE: 97.2 F | OXYGEN SATURATION: 93 % | HEART RATE: 77 BPM | WEIGHT: 203 LBS | DIASTOLIC BLOOD PRESSURE: 70 MMHG | BODY MASS INDEX: 30.77 KG/M2 | SYSTOLIC BLOOD PRESSURE: 106 MMHG | HEIGHT: 68 IN

## 2024-05-15 DIAGNOSIS — E11.59 TYPE 2 DIABETES MELLITUS WITH OTHER CIRCULATORY COMPLICATION, WITHOUT LONG-TERM CURRENT USE OF INSULIN (MULTI): ICD-10-CM

## 2024-05-15 DIAGNOSIS — R35.1 NOCTURIA: ICD-10-CM

## 2024-05-15 DIAGNOSIS — I10 PRIMARY HYPERTENSION: Primary | ICD-10-CM

## 2024-05-15 LAB — POC HEMOGLOBIN A1C: 6.4 % (ref 4.2–6.5)

## 2024-05-15 PROCEDURE — 99214 OFFICE O/P EST MOD 30 MIN: CPT | Performed by: FAMILY MEDICINE

## 2024-05-15 PROCEDURE — 1159F MED LIST DOCD IN RCRD: CPT | Performed by: FAMILY MEDICINE

## 2024-05-15 PROCEDURE — 4010F ACE/ARB THERAPY RXD/TAKEN: CPT | Performed by: FAMILY MEDICINE

## 2024-05-15 PROCEDURE — G2211 COMPLEX E/M VISIT ADD ON: HCPCS | Performed by: FAMILY MEDICINE

## 2024-05-15 PROCEDURE — 83036 HEMOGLOBIN GLYCOSYLATED A1C: CPT | Performed by: FAMILY MEDICINE

## 2024-05-15 PROCEDURE — 3078F DIAST BP <80 MM HG: CPT | Performed by: FAMILY MEDICINE

## 2024-05-15 PROCEDURE — 1160F RVW MEDS BY RX/DR IN RCRD: CPT | Performed by: FAMILY MEDICINE

## 2024-05-15 PROCEDURE — 3074F SYST BP LT 130 MM HG: CPT | Performed by: FAMILY MEDICINE

## 2024-05-15 PROCEDURE — 1036F TOBACCO NON-USER: CPT | Performed by: FAMILY MEDICINE

## 2024-05-15 ASSESSMENT — PATIENT HEALTH QUESTIONNAIRE - PHQ9
1. LITTLE INTEREST OR PLEASURE IN DOING THINGS: NOT AT ALL
SUM OF ALL RESPONSES TO PHQ9 QUESTIONS 1 & 2: 0
2. FEELING DOWN, DEPRESSED OR HOPELESS: NOT AT ALL

## 2024-05-15 ASSESSMENT — COLUMBIA-SUICIDE SEVERITY RATING SCALE - C-SSRS
2. HAVE YOU ACTUALLY HAD ANY THOUGHTS OF KILLING YOURSELF?: NO
6. HAVE YOU EVER DONE ANYTHING, STARTED TO DO ANYTHING, OR PREPARED TO DO ANYTHING TO END YOUR LIFE?: NO
1. IN THE PAST MONTH, HAVE YOU WISHED YOU WERE DEAD OR WISHED YOU COULD GO TO SLEEP AND NOT WAKE UP?: NO

## 2024-05-15 NOTE — PROGRESS NOTES
"Here for fu visit re: DM HTN    compliant with meds     tolerating meds     monitoring BS 130s in am only     denies chest pain SOB DOOLEY diaphoresis nausea palpitations syncope presyncope orthopnea edema leg pain dysarthria aphasia focal weakness headache numbness tingling  visual disturbance gait disturbance polydipsia polyuria weight loss tremor epistaxis melena rectal bleeding tremor fatigue weight change temperature intolerance.        /70   Pulse 77   Temp 36.2 °C (97.2 °F)   Ht 1.727 m (5' 8\")   Wt 92.1 kg (203 lb)   SpO2 93%   BMI 30.87 kg/m²       Appears comfortable  No retractions  Skin without pallor petechia icterus cyanosis  Neck without JVD thyromegaly bruits  Chest clear to auscultation without rales rhonchi wheeze  Heart regular rate and rhythm without murmur  Abdomen soft nondistended nontender without organomegaly or mass  Extremities without erythema edema Homans or cord  Peripheral pulses palpable  Neuro intact      "

## 2024-05-21 ENCOUNTER — OFFICE VISIT (OUTPATIENT)
Dept: VASCULAR SURGERY | Facility: CLINIC | Age: 69
End: 2024-05-21
Payer: COMMERCIAL

## 2024-05-21 ENCOUNTER — LAB (OUTPATIENT)
Dept: LAB | Facility: LAB | Age: 69
End: 2024-05-21
Payer: COMMERCIAL

## 2024-05-21 VITALS
OXYGEN SATURATION: 96 % | HEIGHT: 68 IN | HEART RATE: 75 BPM | WEIGHT: 202 LBS | BODY MASS INDEX: 30.62 KG/M2 | SYSTOLIC BLOOD PRESSURE: 130 MMHG | DIASTOLIC BLOOD PRESSURE: 64 MMHG

## 2024-05-21 DIAGNOSIS — E11.59 TYPE 2 DIABETES MELLITUS WITH OTHER CIRCULATORY COMPLICATION, WITHOUT LONG-TERM CURRENT USE OF INSULIN (MULTI): ICD-10-CM

## 2024-05-21 DIAGNOSIS — R35.1 NOCTURIA: ICD-10-CM

## 2024-05-21 DIAGNOSIS — I65.23 BILATERAL CAROTID ARTERY STENOSIS: ICD-10-CM

## 2024-05-21 LAB
ALBUMIN SERPL BCP-MCNC: 4.4 G/DL (ref 3.4–5)
ALP SERPL-CCNC: 50 U/L (ref 33–136)
ALT SERPL W P-5'-P-CCNC: 32 U/L (ref 10–52)
ANION GAP SERPL CALC-SCNC: 15 MMOL/L (ref 10–20)
AST SERPL W P-5'-P-CCNC: 24 U/L (ref 9–39)
BILIRUB SERPL-MCNC: 0.9 MG/DL (ref 0–1.2)
BUN SERPL-MCNC: 22 MG/DL (ref 6–23)
CALCIUM SERPL-MCNC: 9.7 MG/DL (ref 8.6–10.6)
CHLORIDE SERPL-SCNC: 100 MMOL/L (ref 98–107)
CHOLEST SERPL-MCNC: 196 MG/DL (ref 0–199)
CHOLESTEROL/HDL RATIO: 4.7
CO2 SERPL-SCNC: 29 MMOL/L (ref 21–32)
CREAT SERPL-MCNC: 0.96 MG/DL (ref 0.5–1.3)
EGFRCR SERPLBLD CKD-EPI 2021: 87 ML/MIN/1.73M*2
GLUCOSE SERPL-MCNC: 120 MG/DL (ref 74–99)
HDLC SERPL-MCNC: 41.6 MG/DL
LDLC SERPL CALC-MCNC: 107 MG/DL
NON HDL CHOLESTEROL: 154 MG/DL (ref 0–149)
POTASSIUM SERPL-SCNC: 4.1 MMOL/L (ref 3.5–5.3)
PROT SERPL-MCNC: 6.7 G/DL (ref 6.4–8.2)
PSA SERPL-MCNC: 1.68 NG/ML
SODIUM SERPL-SCNC: 140 MMOL/L (ref 136–145)
TRIGL SERPL-MCNC: 239 MG/DL (ref 0–149)
VLDL: 48 MG/DL (ref 0–40)

## 2024-05-21 PROCEDURE — 84153 ASSAY OF PSA TOTAL: CPT

## 2024-05-21 PROCEDURE — 80061 LIPID PANEL: CPT

## 2024-05-21 PROCEDURE — 3078F DIAST BP <80 MM HG: CPT | Performed by: SURGERY

## 2024-05-21 PROCEDURE — 3075F SYST BP GE 130 - 139MM HG: CPT | Performed by: SURGERY

## 2024-05-21 PROCEDURE — 99214 OFFICE O/P EST MOD 30 MIN: CPT | Performed by: SURGERY

## 2024-05-21 PROCEDURE — 1160F RVW MEDS BY RX/DR IN RCRD: CPT | Performed by: SURGERY

## 2024-05-21 PROCEDURE — 80053 COMPREHEN METABOLIC PANEL: CPT

## 2024-05-21 PROCEDURE — 4010F ACE/ARB THERAPY RXD/TAKEN: CPT | Performed by: SURGERY

## 2024-05-21 PROCEDURE — 1159F MED LIST DOCD IN RCRD: CPT | Performed by: SURGERY

## 2024-05-21 PROCEDURE — 36415 COLL VENOUS BLD VENIPUNCTURE: CPT

## 2024-05-21 PROCEDURE — 3049F LDL-C 100-129 MG/DL: CPT | Performed by: SURGERY

## 2024-06-10 DIAGNOSIS — K21.9 GASTROESOPHAGEAL REFLUX DISEASE WITHOUT ESOPHAGITIS: ICD-10-CM

## 2024-06-11 RX ORDER — OMEPRAZOLE 40 MG/1
40 CAPSULE, DELAYED RELEASE ORAL
Qty: 90 CAPSULE | Refills: 1 | Status: SHIPPED | OUTPATIENT
Start: 2024-06-11

## 2024-06-24 ENCOUNTER — HOSPITAL ENCOUNTER (EMERGENCY)
Facility: HOSPITAL | Age: 69
Discharge: HOME | End: 2024-06-24
Payer: COMMERCIAL

## 2024-06-24 VITALS
BODY MASS INDEX: 31.17 KG/M2 | TEMPERATURE: 96.3 F | SYSTOLIC BLOOD PRESSURE: 128 MMHG | WEIGHT: 205 LBS | HEART RATE: 81 BPM | DIASTOLIC BLOOD PRESSURE: 85 MMHG | OXYGEN SATURATION: 95 % | RESPIRATION RATE: 16 BRPM

## 2024-06-24 DIAGNOSIS — T16.2XXA ACUTE FOREIGN BODY OF LEFT EAR CANAL, INITIAL ENCOUNTER: Primary | ICD-10-CM

## 2024-06-24 DIAGNOSIS — S00.412A EAR CANAL ABRASION, LEFT, INITIAL ENCOUNTER: ICD-10-CM

## 2024-06-24 PROCEDURE — 69200 CLEAR OUTER EAR CANAL: CPT | Performed by: NURSE PRACTITIONER

## 2024-06-24 PROCEDURE — 99283 EMERGENCY DEPT VISIT LOW MDM: CPT

## 2024-06-24 RX ORDER — OFLOXACIN 3 MG/ML
10 SOLUTION AURICULAR (OTIC) DAILY
Qty: 5 ML | Refills: 0 | Status: SHIPPED | OUTPATIENT
Start: 2024-06-24 | End: 2024-07-01

## 2024-06-24 ASSESSMENT — LIFESTYLE VARIABLES
TOTAL SCORE: 0
EVER HAD A DRINK FIRST THING IN THE MORNING TO STEADY YOUR NERVES TO GET RID OF A HANGOVER: NO
HAVE YOU EVER FELT YOU SHOULD CUT DOWN ON YOUR DRINKING: NO
HAVE PEOPLE ANNOYED YOU BY CRITICIZING YOUR DRINKING: NO
EVER FELT BAD OR GUILTY ABOUT YOUR DRINKING: NO

## 2024-06-24 ASSESSMENT — COLUMBIA-SUICIDE SEVERITY RATING SCALE - C-SSRS
1. IN THE PAST MONTH, HAVE YOU WISHED YOU WERE DEAD OR WISHED YOU COULD GO TO SLEEP AND NOT WAKE UP?: NO
6. HAVE YOU EVER DONE ANYTHING, STARTED TO DO ANYTHING, OR PREPARED TO DO ANYTHING TO END YOUR LIFE?: NO
2. HAVE YOU ACTUALLY HAD ANY THOUGHTS OF KILLING YOURSELF?: NO

## 2024-06-24 ASSESSMENT — PAIN - FUNCTIONAL ASSESSMENT: PAIN_FUNCTIONAL_ASSESSMENT: 0-10

## 2024-06-24 ASSESSMENT — PAIN SCALES - GENERAL: PAINLEVEL_OUTOF10: 2

## 2024-06-24 ASSESSMENT — PAIN DESCRIPTION - LOCATION: LOCATION: EAR

## 2024-06-24 NOTE — ED PROVIDER NOTES
HPI   Chief Complaint   Patient presents with    Foreign Body in Ear     Part of hearing aid       Patient is a 69-year-old male who presents ED today due to a foreign body in his left ear canal.  Patient states that he wears.  A few days ago when he took out his hearing aids the silicone and was still stuck in his ear.  Patient has been trying to get out and recently started causing him pain so he started using eardrops which does seem to help the pain but did not seem to get out the foreign body.  Patient denies any fevers or chills.  He denies any discharge from the ear.      History provided by:  Patient   used: No                        [place] Coma Scale Score: 15                     Patient History   Past Medical History:   Diagnosis Date    Chronic fatigue, unspecified 05/12/2022    Chronic fatigue    Diabetes mellitus (Multi)     High cholesterol     Hypothyroidism, unspecified 05/18/2022    Hypothyroidism    Personal history of other diseases of the circulatory system     History of hypertension    Personal history of other diseases of the digestive system 01/22/2016    History of gastroenteritis    Unspecified hearing loss, unspecified ear 01/28/2022    Decreased hearing     Past Surgical History:   Procedure Laterality Date    CT ANGIO NECK  3/9/2022    CT NECK ANGIO W AND WO IV CONTRAST 3/9/2022 PAR [facility]Y [facility]    CT ANGIO NECK  10/21/2022    CT NECK ANGIO W AND WO IV CONTRAST 10/21/2022 [facility]    CT HEAD ANGIO W AND WO IV CONTRAST  3/9/2022    CT HEAD ANGIO W AND WO IV CONTRAST 3/9/2022 [facility]    CT HEAD ANGIO W AND WO IV CONTRAST  10/21/2022    CT HEAD ANGIO W AND WO IV CONTRAST 10/21/2022 [facility]    MR HEAD ANGIO WO IV CONTRAST  3/9/2022    MR HEAD ANGIO WO IV CONTRAST 3/9/2022 PAR [facility]    MR NECK ANGIO WO IV CONTRAST  3/9/2022    MR NECK ANGIO WO IV CONTRAST 3/9/2022 PAR [facility]     Family History   Problem  Relation Name Age of Onset    Heart disease Mother      Diabetes Mother      Hypertension Mother      Hyperlipidemia Father      Cancer Father      Hypertension Father      Rectal cancer Paternal Grandmother      Other (Cardiac disorder) Other Other     Diabetes Other Other     Hypertension Other Other     Lung cancer Other Other      Social History     Tobacco Use    Smoking status: Never    Smokeless tobacco: Never   Vaping Use    Vaping status: Never Used   Substance Use Topics    Alcohol use: Not Currently    Drug use: Never       Physical Exam   ED Triage Vitals [06/24/24 0643]   Temperature Heart Rate Respirations BP   35.7 °C (96.3 °F) 81 16 128/85      Pulse Ox Temp Source Heart Rate Source Patient Position   95 % Temporal Monitor --      BP Location FiO2 (%)     Right arm --       Physical Exam  Vitals and nursing note reviewed.   Constitutional:       General: He is not in acute distress.     Appearance: He is well-developed.   HENT:      Head: Normocephalic and atraumatic.      Jaw: No trismus.      Right Ear: Tympanic membrane and ear canal normal.      Left Ear: External ear normal. Tenderness present. A foreign body (Silicone and a hearing aid) is present.      Ears:      Comments: There appears to be a small abrasion with slight amount of bloody discharge near the foreign body.     Nose: No congestion or rhinorrhea.      Right Sinus: No maxillary sinus tenderness or frontal sinus tenderness.      Left Sinus: No maxillary sinus tenderness or frontal sinus tenderness.      Mouth/Throat:      Lips: Pink.      Mouth: Mucous membranes are moist. No injury.      Palate: No mass and lesions.      Pharynx: Oropharynx is clear. Uvula midline.      Tonsils: No tonsillar exudate or tonsillar abscesses.   Eyes:      Conjunctiva/sclera: Conjunctivae normal.   Cardiovascular:      Rate and Rhythm: Normal rate and regular rhythm.      Heart sounds: No murmur heard.  Pulmonary:      Effort: Pulmonary effort is  normal. No respiratory distress.      Breath sounds: Normal breath sounds.   Abdominal:      Palpations: Abdomen is soft.      Tenderness: There is no abdominal tenderness.   Musculoskeletal:         General: No swelling.      Cervical back: Neck supple.   Skin:     General: Skin is warm and dry.      Capillary Refill: Capillary refill takes less than 2 seconds.   Neurological:      Mental Status: He is alert.   Psychiatric:         Mood and Affect: Mood normal.         ED Course & MDM   Diagnoses as of 06/24/24 0737   Acute foreign body of left ear canal, initial encounter   Ear canal abrasion, left, initial encounter       Medical Decision Making  Differential diagnosis: Ear foreign body, ear abrasion, cerumen impaction, serous otitis media.  Patient's vital signs are stable.  Patient's examination is consistent with an ear foreign body and small ear abrasions.  We did remove the foreign body, see procedure note.  Patient does have small abrasions to the ear canal and he was given a prescription for ofloxacin drops.  Distal TM appears to be slightly bruised, no perforation, no evidence of otitis media.  Patient was advised to follow-up with both his PCP and ENT.  Turn precautions were discussed with patient he verbalized understanding of these.    I discussed the differential, results and discharge plan with the patient.  I emphasized the importance of follow-up with the physician I referred them to in the timeframe recommended.  I explained reasons for the them to return to the Emergency Department. Additional verbal discharge instructions were also given and discussed with them to supplement those generated by the EMR. We also discussed medications that were prescribed (if any) and appropriate use of OTC medications including common side effects and interactions. All questions were addressed.  They understand return precautions and discharge instructions. They expressed understanding.             Procedure  Foreign Body Removal - Orifice    Performed by: EDMOND Mariscal  Authorized by: EDMOND Mariscal    Consent:     Consent obtained:  Verbal    Consent given by:  Patient    Risks, benefits, and alternatives were discussed: yes      Risks discussed:  Bleeding, infection, TM perforation, damage to surrounding structures, need for surgical removal, worsening of condition, incomplete removal and pain    Alternatives discussed:  No treatment, delayed treatment, alternative treatment and observation  Universal protocol:     Procedure explained and questions answered to patient or proxy's satisfaction: yes      Relevant documents present and verified: yes      Required blood products, implants, devices, and special equipment available: yes      Immediately prior to procedure, a time out was called: yes      Patient identity confirmed:  Verbally with patient and arm band  Location:     Location:  Ear    Ear location:  L ear  Pre-procedure details:     Imaging:  None  Sedation:     Sedation type:  None  Anesthesia:     Topical anesthetic:  None  Procedure details:     Localization method:  Direct visualization    Removal mechanism:  Forceps    Procedure complexity:  Simple    Foreign bodies recovered:  1    Intact foreign body removal: yes    Post-procedure details:     Confirmation:  No additional foreign bodies on visualization    Procedure completion:  Tolerated well, no immediate complications       EDMOND Mariscal  06/24/24 0743

## 2024-06-24 NOTE — ED TRIAGE NOTES
Pt states that he noticed Wednesday that his LEFT hearing aid was broken. Since then he has had irritation in the left jaw/ear/face. Pt states that his son looked in his ear and saw the broken part in his ear. Pt states that he has been using drops, but the pain is getting worse

## 2024-07-02 ENCOUNTER — OFFICE VISIT (OUTPATIENT)
Dept: OTOLARYNGOLOGY | Facility: CLINIC | Age: 69
End: 2024-07-02
Payer: COMMERCIAL

## 2024-07-02 VITALS
RESPIRATION RATE: 16 BRPM | SYSTOLIC BLOOD PRESSURE: 116 MMHG | BODY MASS INDEX: 31.07 KG/M2 | WEIGHT: 205 LBS | HEART RATE: 79 BPM | OXYGEN SATURATION: 97 % | HEIGHT: 68 IN | DIASTOLIC BLOOD PRESSURE: 79 MMHG

## 2024-07-02 DIAGNOSIS — S00.432A TRAUMATIC HEMATOMA OF LEFT EAR CANAL, INITIAL ENCOUNTER: ICD-10-CM

## 2024-07-02 DIAGNOSIS — S09.302A INJURY OF TYMPANIC MEMBRANE OF LEFT EAR, INITIAL ENCOUNTER: ICD-10-CM

## 2024-07-02 DIAGNOSIS — H93.8X2 SENSATION OF FULLNESS IN LEFT EAR: ICD-10-CM

## 2024-07-02 DIAGNOSIS — S00.412A EAR CANAL ABRASION, LEFT, INITIAL ENCOUNTER: Primary | ICD-10-CM

## 2024-07-02 PROCEDURE — 1159F MED LIST DOCD IN RCRD: CPT | Performed by: STUDENT IN AN ORGANIZED HEALTH CARE EDUCATION/TRAINING PROGRAM

## 2024-07-02 PROCEDURE — 1036F TOBACCO NON-USER: CPT | Performed by: STUDENT IN AN ORGANIZED HEALTH CARE EDUCATION/TRAINING PROGRAM

## 2024-07-02 PROCEDURE — 69420 INCISION OF EARDRUM: CPT | Performed by: STUDENT IN AN ORGANIZED HEALTH CARE EDUCATION/TRAINING PROGRAM

## 2024-07-02 PROCEDURE — 4010F ACE/ARB THERAPY RXD/TAKEN: CPT | Performed by: STUDENT IN AN ORGANIZED HEALTH CARE EDUCATION/TRAINING PROGRAM

## 2024-07-02 PROCEDURE — 3049F LDL-C 100-129 MG/DL: CPT | Performed by: STUDENT IN AN ORGANIZED HEALTH CARE EDUCATION/TRAINING PROGRAM

## 2024-07-02 PROCEDURE — 1160F RVW MEDS BY RX/DR IN RCRD: CPT | Performed by: STUDENT IN AN ORGANIZED HEALTH CARE EDUCATION/TRAINING PROGRAM

## 2024-07-02 PROCEDURE — 3074F SYST BP LT 130 MM HG: CPT | Performed by: STUDENT IN AN ORGANIZED HEALTH CARE EDUCATION/TRAINING PROGRAM

## 2024-07-02 PROCEDURE — 3078F DIAST BP <80 MM HG: CPT | Performed by: STUDENT IN AN ORGANIZED HEALTH CARE EDUCATION/TRAINING PROGRAM

## 2024-07-02 PROCEDURE — 99213 OFFICE O/P EST LOW 20 MIN: CPT | Performed by: STUDENT IN AN ORGANIZED HEALTH CARE EDUCATION/TRAINING PROGRAM

## 2024-07-02 PROCEDURE — 1126F AMNT PAIN NOTED NONE PRSNT: CPT | Performed by: STUDENT IN AN ORGANIZED HEALTH CARE EDUCATION/TRAINING PROGRAM

## 2024-07-02 RX ORDER — OFLOXACIN 3 MG/ML
5 SOLUTION AURICULAR (OTIC) 2 TIMES DAILY
Qty: 10 ML | Refills: 0 | Status: SHIPPED | OUTPATIENT
Start: 2024-07-02 | End: 2024-07-09

## 2024-07-02 SDOH — ECONOMIC STABILITY: FOOD INSECURITY: WITHIN THE PAST 12 MONTHS, THE FOOD YOU BOUGHT JUST DIDN'T LAST AND YOU DIDN'T HAVE MONEY TO GET MORE.: NEVER TRUE

## 2024-07-02 SDOH — ECONOMIC STABILITY: FOOD INSECURITY: WITHIN THE PAST 12 MONTHS, YOU WORRIED THAT YOUR FOOD WOULD RUN OUT BEFORE YOU GOT MONEY TO BUY MORE.: NEVER TRUE

## 2024-07-02 ASSESSMENT — PATIENT HEALTH QUESTIONNAIRE - PHQ9
1. LITTLE INTEREST OR PLEASURE IN DOING THINGS: NOT AT ALL
2. FEELING DOWN, DEPRESSED OR HOPELESS: NOT AT ALL
SUM OF ALL RESPONSES TO PHQ9 QUESTIONS 1 AND 2: 0

## 2024-07-02 ASSESSMENT — LIFESTYLE VARIABLES
HOW MANY STANDARD DRINKS CONTAINING ALCOHOL DO YOU HAVE ON A TYPICAL DAY: PATIENT DOES NOT DRINK
AUDIT-C TOTAL SCORE: 0
HOW OFTEN DO YOU HAVE SIX OR MORE DRINKS ON ONE OCCASION: NEVER
SKIP TO QUESTIONS 9-10: 1
HOW OFTEN DO YOU HAVE A DRINK CONTAINING ALCOHOL: NEVER

## 2024-07-02 ASSESSMENT — ENCOUNTER SYMPTOMS
OCCASIONAL FEELINGS OF UNSTEADINESS: 0
LOSS OF SENSATION IN FEET: 0
DEPRESSION: 0

## 2024-07-02 ASSESSMENT — PAIN SCALES - GENERAL: PAINLEVEL: 0-NO PAIN

## 2024-07-02 NOTE — PROGRESS NOTES
SUBJECTIVE  Patient ID: Calvin Figueroa is a 69 y.o. male who presents for ear blockage .    History: 67 year-old male referred by Dr. Aman Lau for evaluation of several concerns.     The patient reports that over the last month he reports that his left ear has felt blocked. He notes some muffling of hearing. Does wear hearing aids baseline. Has noted some associated dizziness; some off-balance sensation. Was sick over the holidays but this started prior to his URI. Does note some tinnitus when hearing aids are removed. He denies otalgia, otorrhea, and autophony. He denies a history of prior ear surgery, exposure to ototoxic drugs or agents. 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 Father had hearing issues.     Notes some mild nasal drainage. No nasal airway obstruction/congestion.     He reports intermittent hoarseness; voice will sometimes go out. No pattern to this. Does have baseline acid reflux; takes a daily omeprazole. Will also take Gaviscon for breakthrough symptoms. Notes some slightly harder swallow with pills in the morning but otherwise no trouble with dysphagia, odynophagia, hemoptysis. Never a smoker. He is using his voice a lot and frequently talking on the phone. Drinks a fair amount of caffeine during the day.     Update 1/19/2023:  Follow-up today for new issue. Patient has an asymmetric sensorineural hearing loss as well as new onset vertigo.     The patient reports that starting March 2022 he developed spinning vertigo associated with nausea/vomiting. This lasted for several hours until he got a medication that slowed the spinning. However, he was admitted for further work-up and was found to have left carotid artery stenosis. He reports that a stroke was ruled out. During this work-up he was found to have a left carotid artery blockage and he is now s/p left carotid endarterectomy. Has taken meclizine a few times since discharge. Since then he  has seen PT several times; this seems to have helped him compensate.     He notes occasional muffled hearing on the left. Did not appreciate any change in hearing. He denies otalgia, otorrhea. Does have some baseline tinnitus. He denies a history of prior ear surgery, exposure to ototoxic drugs or agents. He was a  of large trucks at construction sites. Father and younger sister had hearing loss.     Update 7/2/2024:  Patient returns for new issue at today's visit.  He reports that roughly 2 weeks ago he noticed that the silicone cone on his hearing aid was missing on the left side.  He realized it was stuck in the left ear but could not get it out.  This was slightly muffling his hearing but then early last week he began experiencing discomfort which became more and more acute in nature.  He presented to the emergency department 6/24/2024 where he reports that the foreign body was successfully removed after several attempts.  His otalgia has resolved but he is noting persistent ear fullness and muffling of hearing on that side.    OBJECTIVE  Physical Exam  CONSTITUTIONAL: Well appearing male who appears stated age.  PSYCHIATRIC: Alert, appropriate mood and affect.  RESPIRATORY: Normal inspiration and expiration and chest wall expansion; no use of accessory muscles to breathe.  VOICE: Clear speech without hoarseness. No stridor nor stertor.  HEAD AND FACE: Symmetric facial features. No cutaneous masses or lesions were visualized.  LEFT EAR: Normal external ear and post auricular area, no visible lesions, external auditory canal patent, tympanic membrane intact, there is swelling and ecchymosis of the inferior-posterior quadrant of the TM concerning for hematoma, there is otherwise no effusion or hemotympanum within the middle ear.    --------------------------------------------------  Procedure: Myringotomy, left  Indication: TM hematoma  Informed consent verbally obtained: The risks, benefits, alternatives,  and expectations were discussed with the patient, who wished to proceed  Anesthesia: topical phenol    The patient was positioned, an appropriately sized ear speculum was introduced and the binocular microscope was brought into the field. The left ear canal was evaluated. Phenol was applied to the postero-inferior quadrant over the hematoma. A stab incision was made with the myringotomy blade.  Dark blood was then suctioned from the hematoma space.  Ciprodex eardrops were then applied.  I did not appreciate a through and through myringotomy through the tympanic membrane.    The procedure was well tolerated and there were no complications.  --------------------------------------------------    ASSESSMENT/PLAN  Diagnoses and all orders for this visit:  Ear canal abrasion, left, initial encounter  -     ofloxacin (Floxin) 0.3 % otic solution; Administer 5 drops into the left ear 2 times a day for 7 days.  Injury of tympanic membrane of left ear, initial encounter  Traumatic hematoma of left ear canal, initial encounter  Sensation of fullness in left ear      69 y.o. male previously seen for multiple concerns.     0.  Traumatic injury to the left tympanic membrane, hematoma  The patient was recently seen in the emergency department for removal of a silicone ear cone from his hearing aid that was stuck against the tympanic membrane.  He was having significant otalgia and injury was appreciated by the emergency department provider.  The foreign body was successfully removed with the patient has noted persistent decreased hearing on that side.  Unfortunate, it appears that he has developed a hematoma at the site of injury along the tympanic membrane which is likely causing a conductive/mixed hearing loss.  We discussed his options including continued observation versus attempt to enedelia the hematoma to aid in healing.  He opted for procedural intervention which is successfully performed.  Advised him to continue his  antibiotic eardrops over the next 5 to 7 days.  If he has persistent symptoms he will call to schedule for repeat evaluation and audiogram.    1. Asymmetric sensorineural hearing loss, vestibular deficit, dizziness, history of vertigo of unclear etiology  The patient reports sudden onset vertigo in March 2022. He reports a stroke work-up was negative. The patient presents with an asymmetric sensorineural hearing loss appreciated on audiogram. He also had an ENG which demonstrated what appeared to be a central vestibular deficit. The patient reports that after vestibular therapy he is already mostly compensated with only occasional episodes of dizziness.     The balance system was discussed in detail, and the etiologies of imbalance and dizziness were explained. We discussed that the patient's symptoms and physical exam findings were suggestive of some type of vestibular insult. Given his history I wonder if he suffered from some type of vestibular neuritis. We discussed that he is already completed the appropriate therapy but that some patients continue to experience persistent symptoms. The patient has imaging that suggest no concerning lesions of the IAC nor CPA. The patient should continue to follow with yearly audiograms.     2. Dysphonia/Hoarseness, suspected LPR in setting of acid reflux disease  The patient initially presented to me for intermittent hoarseness. I suspected that this was a multifactorial process. The patient notes that he is using his voice constantly throughout the day, often on the phone. He also notes a history of acid reflux disease for which he notes fairly frequent breakthrough symptoms despite daily medication. On initial flexible laryngoscopy I appreciated no concerning lesions of the vocal folds but there is evidence of interarytenoid and postcricoid edema, often seen with acid reflux disease. I previously recommended he see gastroenterology for further work-up.     The patient can  see me as needed.    This note was created using speech recognition transcription software. Despite proofreading, typographical errors may be present that affect the meaning of the content. Please contact my office with any questions.

## 2024-08-17 DIAGNOSIS — K21.9 GASTROESOPHAGEAL REFLUX DISEASE WITHOUT ESOPHAGITIS: ICD-10-CM

## 2024-08-20 RX ORDER — TELMISARTAN 80 MG/1
80 TABLET ORAL DAILY
Qty: 90 TABLET | Refills: 0 | Status: SHIPPED | OUTPATIENT
Start: 2024-08-20

## 2024-09-03 DIAGNOSIS — R42 VERTIGO: Primary | ICD-10-CM

## 2024-09-03 RX ORDER — MECLIZINE HCL 12.5 MG 12.5 MG/1
12.5 TABLET ORAL 3 TIMES DAILY PRN
Qty: 30 TABLET | Refills: 0 | Status: SHIPPED | OUTPATIENT
Start: 2024-09-03

## 2024-09-20 DIAGNOSIS — R42 VERTIGO: ICD-10-CM

## 2024-09-21 DIAGNOSIS — E11.59 TYPE 2 DIABETES MELLITUS WITH OTHER CIRCULATORY COMPLICATION, WITHOUT LONG-TERM CURRENT USE OF INSULIN: Chronic | ICD-10-CM

## 2024-09-21 RX ORDER — MECLIZINE HCL 12.5 MG 12.5 MG/1
12.5 TABLET ORAL 3 TIMES DAILY PRN
Qty: 30 TABLET | Refills: 0 | Status: SHIPPED | OUTPATIENT
Start: 2024-09-21

## 2024-09-23 DIAGNOSIS — M19.90 OSTEOARTHRITIS, UNSPECIFIED OSTEOARTHRITIS TYPE, UNSPECIFIED SITE: ICD-10-CM

## 2024-09-23 DIAGNOSIS — R42 VERTIGO: ICD-10-CM

## 2024-09-23 RX ORDER — MECLIZINE HCL 12.5 MG 12.5 MG/1
12.5 TABLET ORAL 3 TIMES DAILY PRN
Qty: 30 TABLET | Refills: 0 | Status: SHIPPED | OUTPATIENT
Start: 2024-09-23

## 2024-09-23 RX ORDER — SEMAGLUTIDE 0.68 MG/ML
0.5 INJECTION, SOLUTION SUBCUTANEOUS
Qty: 3 ML | Refills: 11 | Status: SHIPPED | OUTPATIENT
Start: 2024-09-29

## 2024-09-24 RX ORDER — MELOXICAM 15 MG/1
15 TABLET ORAL DAILY
Qty: 90 TABLET | Refills: 0 | Status: SHIPPED | OUTPATIENT
Start: 2024-09-24

## 2024-10-03 ENCOUNTER — TELEPHONE (OUTPATIENT)
Dept: PRIMARY CARE | Facility: CLINIC | Age: 69
End: 2024-10-03
Payer: COMMERCIAL

## 2024-10-03 NOTE — TELEPHONE ENCOUNTER
Lm for the patient to have  his insurance company fax to us whatever is needed for his prescription to be filled.

## 2024-10-03 NOTE — TELEPHONE ENCOUNTER
Patient called saying that [employer] needs us to call them about the patients ozempic because they can only lorrie 3 days at once. I called [employer] and the pharmacist said that it cost too much so the insurance will not pay for it. When I look in epic it doesn't look like it needs a prior auth. Lady said I should ask you about it. Please advise.

## 2024-10-11 DIAGNOSIS — I10 PRIMARY HYPERTENSION: ICD-10-CM

## 2024-10-11 DIAGNOSIS — E78.2 MIXED HYPERLIPIDEMIA: ICD-10-CM

## 2024-10-11 DIAGNOSIS — I25.10 3-VESSEL CAD: ICD-10-CM

## 2024-10-11 DIAGNOSIS — E11.9 TYPE 2 DIABETES MELLITUS WITHOUT COMPLICATION, WITHOUT LONG-TERM CURRENT USE OF INSULIN (MULTI): ICD-10-CM

## 2024-10-14 RX ORDER — EMPAGLIFLOZIN 10 MG/1
10 TABLET, FILM COATED ORAL DAILY
Qty: 90 TABLET | Refills: 0 | Status: SHIPPED | OUTPATIENT
Start: 2024-10-14

## 2024-10-14 RX ORDER — METFORMIN HYDROCHLORIDE 1000 MG/1
1000 TABLET ORAL 2 TIMES DAILY
Qty: 180 TABLET | Refills: 0 | Status: SHIPPED | OUTPATIENT
Start: 2024-10-14

## 2024-10-14 RX ORDER — CHLORTHALIDONE 25 MG/1
25 TABLET ORAL DAILY
Qty: 90 TABLET | Refills: 0 | Status: SHIPPED | OUTPATIENT
Start: 2024-10-14

## 2024-10-14 RX ORDER — ATORVASTATIN CALCIUM 80 MG/1
80 TABLET, FILM COATED ORAL DAILY
Qty: 90 TABLET | Refills: 0 | Status: SHIPPED | OUTPATIENT
Start: 2024-10-14

## 2024-10-24 DIAGNOSIS — K21.9 GASTROESOPHAGEAL REFLUX DISEASE WITHOUT ESOPHAGITIS: ICD-10-CM

## 2024-10-25 RX ORDER — TELMISARTAN 80 MG/1
80 TABLET ORAL DAILY
Qty: 90 TABLET | Refills: 0 | Status: SHIPPED | OUTPATIENT
Start: 2024-10-25

## 2024-11-08 DIAGNOSIS — K21.9 GASTROESOPHAGEAL REFLUX DISEASE WITHOUT ESOPHAGITIS: ICD-10-CM

## 2024-11-11 RX ORDER — OMEPRAZOLE 40 MG/1
40 CAPSULE, DELAYED RELEASE ORAL
Qty: 90 CAPSULE | Refills: 3 | Status: SHIPPED | OUTPATIENT
Start: 2024-11-11

## 2024-11-18 ENCOUNTER — APPOINTMENT (OUTPATIENT)
Dept: PRIMARY CARE | Facility: CLINIC | Age: 69
End: 2024-11-18
Payer: COMMERCIAL

## 2024-11-18 VITALS
WEIGHT: 205.7 LBS | DIASTOLIC BLOOD PRESSURE: 70 MMHG | HEART RATE: 72 BPM | OXYGEN SATURATION: 96 % | SYSTOLIC BLOOD PRESSURE: 105 MMHG | BODY MASS INDEX: 31.17 KG/M2 | HEIGHT: 68 IN

## 2024-11-18 DIAGNOSIS — I10 PRIMARY HYPERTENSION: ICD-10-CM

## 2024-11-18 DIAGNOSIS — E11.59 TYPE 2 DIABETES MELLITUS WITH OTHER CIRCULATORY COMPLICATION, WITHOUT LONG-TERM CURRENT USE OF INSULIN: Chronic | ICD-10-CM

## 2024-11-18 PROCEDURE — 4010F ACE/ARB THERAPY RXD/TAKEN: CPT | Performed by: FAMILY MEDICINE

## 2024-11-18 PROCEDURE — 1123F ACP DISCUSS/DSCN MKR DOCD: CPT | Performed by: FAMILY MEDICINE

## 2024-11-18 PROCEDURE — 1160F RVW MEDS BY RX/DR IN RCRD: CPT | Performed by: FAMILY MEDICINE

## 2024-11-18 PROCEDURE — 3049F LDL-C 100-129 MG/DL: CPT | Performed by: FAMILY MEDICINE

## 2024-11-18 PROCEDURE — 3008F BODY MASS INDEX DOCD: CPT | Performed by: FAMILY MEDICINE

## 2024-11-18 PROCEDURE — 3074F SYST BP LT 130 MM HG: CPT | Performed by: FAMILY MEDICINE

## 2024-11-18 PROCEDURE — 99214 OFFICE O/P EST MOD 30 MIN: CPT | Performed by: FAMILY MEDICINE

## 2024-11-18 PROCEDURE — 1158F ADVNC CARE PLAN TLK DOCD: CPT | Performed by: FAMILY MEDICINE

## 2024-11-18 PROCEDURE — 1159F MED LIST DOCD IN RCRD: CPT | Performed by: FAMILY MEDICINE

## 2024-11-18 PROCEDURE — 3078F DIAST BP <80 MM HG: CPT | Performed by: FAMILY MEDICINE

## 2024-11-18 PROCEDURE — 1036F TOBACCO NON-USER: CPT | Performed by: FAMILY MEDICINE

## 2024-11-18 RX ORDER — CHLORTHALIDONE 25 MG/1
12.5 TABLET ORAL DAILY
Start: 2024-11-18

## 2024-11-18 RX ORDER — SEMAGLUTIDE 0.68 MG/ML
0.5 INJECTION, SOLUTION SUBCUTANEOUS
Qty: 9 ML | Refills: 1 | Status: SHIPPED | OUTPATIENT
Start: 2024-11-18

## 2024-11-18 ASSESSMENT — PATIENT HEALTH QUESTIONNAIRE - PHQ9
SUM OF ALL RESPONSES TO PHQ9 QUESTIONS 1 AND 2: 0
1. LITTLE INTEREST OR PLEASURE IN DOING THINGS: NOT AT ALL
2. FEELING DOWN, DEPRESSED OR HOPELESS: NOT AT ALL

## 2024-11-18 NOTE — PATIENT INSTRUCTIONS
Stop jardiance   Take 1/2 tab chlorthalidone daily  Monitor BP goal is < 130/80  Continue other meds  FU with eye doctor  annually    FU here in 6 months

## 2024-11-18 NOTE — PROGRESS NOTES
"Here for fu visit re: HTN DM    compliant with meds     tolerating meds     monitoring BP at home : BP <130/80     denies chest pain SOB DOOLEY diaphoresis nausea palpitations syncope presyncope orthopnea edema leg pain dysarthria aphasia focal weakness headache numbness tingling  visual disturbance gait disturbance polydipsia polyuria weight loss tremor epistaxis melena rectal bleeding fatigue weight change temperature intolerance.        /70   Pulse 72   Ht 1.727 m (5' 8\")   Wt 93.3 kg (205 lb 11.2 oz)   SpO2 96%   BMI 31.28 kg/m²       Appears comfortable  No retractions  Skin without pallor petechia icterus cyanosis  Neck without JVD thyromegaly bruits  Chest clear to auscultation without rales rhonchi wheeze  Heart regular rate and rhythm without murmur  Abdomen soft nondistended nontender without organomegaly or mass  Extremities without erythema edema Homans or cord  Peripheral pulses palpable  feet warm without pallor ulcerations erythema  Dorsalis pedis pulses palpable  Sensate to microfilament bilaterally      11/8/24     LDL 74 tot chol 150 HDL 37    A1C 5.5  "

## 2024-11-30 DIAGNOSIS — M19.90 OSTEOARTHRITIS, UNSPECIFIED OSTEOARTHRITIS TYPE, UNSPECIFIED SITE: ICD-10-CM

## 2024-12-02 RX ORDER — MELOXICAM 15 MG/1
15 TABLET ORAL DAILY
Qty: 90 TABLET | Refills: 1 | Status: SHIPPED | OUTPATIENT
Start: 2024-12-02

## 2024-12-18 DIAGNOSIS — E11.9 TYPE 2 DIABETES MELLITUS WITHOUT COMPLICATION, WITHOUT LONG-TERM CURRENT USE OF INSULIN (MULTI): ICD-10-CM

## 2024-12-18 DIAGNOSIS — E78.2 MIXED HYPERLIPIDEMIA: ICD-10-CM

## 2024-12-18 DIAGNOSIS — I10 PRIMARY HYPERTENSION: ICD-10-CM

## 2024-12-20 RX ORDER — ATORVASTATIN CALCIUM 80 MG/1
80 TABLET, FILM COATED ORAL DAILY
Qty: 90 TABLET | Refills: 1 | Status: SHIPPED | OUTPATIENT
Start: 2024-12-20

## 2024-12-20 RX ORDER — CHLORTHALIDONE 25 MG/1
25 TABLET ORAL DAILY
Qty: 90 TABLET | Refills: 1 | Status: SHIPPED | OUTPATIENT
Start: 2024-12-20

## 2024-12-20 RX ORDER — METFORMIN HYDROCHLORIDE 1000 MG/1
1000 TABLET ORAL 2 TIMES DAILY
Qty: 180 TABLET | Refills: 1 | Status: SHIPPED | OUTPATIENT
Start: 2024-12-20

## 2024-12-31 DIAGNOSIS — K21.9 GASTROESOPHAGEAL REFLUX DISEASE WITHOUT ESOPHAGITIS: ICD-10-CM

## 2025-01-02 RX ORDER — TELMISARTAN 80 MG/1
80 TABLET ORAL DAILY
Qty: 90 TABLET | Refills: 1 | Status: SHIPPED | OUTPATIENT
Start: 2025-01-02

## 2025-02-21 DIAGNOSIS — E11.59 TYPE 2 DIABETES MELLITUS WITH OTHER CIRCULATORY COMPLICATION, WITHOUT LONG-TERM CURRENT USE OF INSULIN: Chronic | ICD-10-CM

## 2025-02-21 RX ORDER — SEMAGLUTIDE 0.68 MG/ML
0.5 INJECTION, SOLUTION SUBCUTANEOUS
Qty: 3 ML | Refills: 11 | Status: SHIPPED | OUTPATIENT
Start: 2025-02-23

## 2025-03-03 ENCOUNTER — TELEPHONE (OUTPATIENT)
Dept: PRIMARY CARE | Facility: CLINIC | Age: 70
End: 2025-03-03
Payer: COMMERCIAL

## 2025-03-03 DIAGNOSIS — Z12.11 COLON CANCER SCREENING: Primary | ICD-10-CM

## 2025-03-03 DIAGNOSIS — K63.5 POLYP OF COLON, UNSPECIFIED PART OF COLON, UNSPECIFIED TYPE: ICD-10-CM

## 2025-03-03 NOTE — TELEPHONE ENCOUNTER
Pt was hoping for an order for his next Colonoscopy? His last one was in 2019 and it said to repeat in 5 years, so he would be due for one.

## 2025-03-06 DIAGNOSIS — Z12.11 COLON CANCER SCREENING: Primary | ICD-10-CM

## 2025-03-06 RX ORDER — POLYETHYLENE GLYCOL 3350, SODIUM SULFATE ANHYDROUS, SODIUM BICARBONATE, SODIUM CHLORIDE, POTASSIUM CHLORIDE 236; 22.74; 6.74; 5.86; 2.97 G/4L; G/4L; G/4L; G/4L; G/4L
4 POWDER, FOR SOLUTION ORAL ONCE
Qty: 4000 ML | Refills: 0 | Status: SHIPPED | OUTPATIENT
Start: 2025-03-06 | End: 2025-03-06

## 2025-03-06 NOTE — TELEPHONE ENCOUNTER
Pended colonoscopy prep, Golytely, for approval.  Instructions sent to patient through [employer], and was called to  prescription in the next couple of days.

## 2025-03-07 ENCOUNTER — TELEPHONE (OUTPATIENT)
Dept: SURGERY | Facility: CLINIC | Age: 70
End: 2025-03-07
Payer: COMMERCIAL

## 2025-03-07 NOTE — TELEPHONE ENCOUNTER
Discussed with patient that he is cleared to hold his Ozempic for seven days prior to his colonoscopy.  Patient verbalized understanding and all questions and concerns were addressed and answered.

## 2025-03-07 NOTE — TELEPHONE ENCOUNTER
----- Message from Martin Sahni sent at 3/6/2025  4:20 PM EST -----  Regarding: RE: OZEMPIC HOLD FOR SEVEN DAYS  Yes, he may hold his Ozempic for 7 days prior to his colonoscopy  ----- Message -----  From: Lo Back MA  Sent: 3/6/2025   8:19 AM EST  To: Martin Sahni MD  Subject: OZEMPIC HOLD FOR SEVEN DAYS                      Patient is scheduled for a colonoscopy on April 18 with Dr. Cortez.  Is patient cleared to hold his Ozempic for seven days prior to his colonoscopy?     Lo Back MA III  General Surgery  Dr. Yoseph Cha  [phone]

## 2025-04-18 ENCOUNTER — ANESTHESIA (OUTPATIENT)
Dept: GASTROENTEROLOGY | Facility: HOSPITAL | Age: 70
End: 2025-04-18
Payer: COMMERCIAL

## 2025-04-18 ENCOUNTER — HOSPITAL ENCOUNTER (OUTPATIENT)
Dept: GASTROENTEROLOGY | Facility: HOSPITAL | Age: 70
Discharge: HOME | End: 2025-04-18
Payer: COMMERCIAL

## 2025-04-18 ENCOUNTER — ANESTHESIA EVENT (OUTPATIENT)
Dept: GASTROENTEROLOGY | Facility: HOSPITAL | Age: 70
End: 2025-04-18
Payer: COMMERCIAL

## 2025-04-18 VITALS
WEIGHT: 205.03 LBS | SYSTOLIC BLOOD PRESSURE: 135 MMHG | BODY MASS INDEX: 31.17 KG/M2 | HEART RATE: 88 BPM | DIASTOLIC BLOOD PRESSURE: 80 MMHG | OXYGEN SATURATION: 98 % | RESPIRATION RATE: 18 BRPM | TEMPERATURE: 96.8 F

## 2025-04-18 DIAGNOSIS — K63.5 POLYP OF COLON, UNSPECIFIED PART OF COLON, UNSPECIFIED TYPE: ICD-10-CM

## 2025-04-18 DIAGNOSIS — Z12.11 COLON CANCER SCREENING: ICD-10-CM

## 2025-04-18 LAB — GLUCOSE BLD MANUAL STRIP-MCNC: 109 MG/DL (ref 74–99)

## 2025-04-18 PROCEDURE — 7100000009 HC PHASE TWO TIME - INITIAL BASE CHARGE: Performed by: SURGERY

## 2025-04-18 PROCEDURE — 82947 ASSAY GLUCOSE BLOOD QUANT: CPT

## 2025-04-18 PROCEDURE — 7100000010 HC PHASE TWO TIME - EACH INCREMENTAL 1 MINUTE: Performed by: SURGERY

## 2025-04-18 PROCEDURE — 45378 DIAGNOSTIC COLONOSCOPY: CPT | Performed by: SURGERY

## 2025-04-18 PROCEDURE — 3700000002 HC GENERAL ANESTHESIA TIME - EACH INCREMENTAL 1 MINUTE: Performed by: SURGERY

## 2025-04-18 PROCEDURE — 3700000001 HC GENERAL ANESTHESIA TIME - INITIAL BASE CHARGE: Performed by: SURGERY

## 2025-04-18 PROCEDURE — 2500000004 HC RX 250 GENERAL PHARMACY W/ HCPCS (ALT 636 FOR OP/ED)

## 2025-04-18 RX ORDER — LIDOCAINE HYDROCHLORIDE 20 MG/ML
INJECTION, SOLUTION EPIDURAL; INFILTRATION; INTRACAUDAL; PERINEURAL AS NEEDED
Status: DISCONTINUED | OUTPATIENT
Start: 2025-04-18 | End: 2025-04-18

## 2025-04-18 RX ORDER — PROPOFOL 10 MG/ML
INJECTION, EMULSION INTRAVENOUS AS NEEDED
Status: DISCONTINUED | OUTPATIENT
Start: 2025-04-18 | End: 2025-04-18

## 2025-04-18 RX ADMIN — LIDOCAINE HYDROCHLORIDE 60 MG: 20 INJECTION, SOLUTION EPIDURAL; INFILTRATION; INTRACAUDAL; PERINEURAL at 12:05

## 2025-04-18 RX ADMIN — PROPOFOL 100 MCG/KG/MIN: 10 INJECTION, EMULSION INTRAVENOUS at 12:06

## 2025-04-18 RX ADMIN — PROPOFOL 80 MG: 10 INJECTION, EMULSION INTRAVENOUS at 12:05

## 2025-04-18 SDOH — HEALTH STABILITY: MENTAL HEALTH: CURRENT SMOKER: 0

## 2025-04-18 ASSESSMENT — PAIN - FUNCTIONAL ASSESSMENT
PAIN_FUNCTIONAL_ASSESSMENT: 0-10

## 2025-04-18 ASSESSMENT — PAIN SCALES - GENERAL
PAINLEVEL_OUTOF10: 0 - NO PAIN
PAIN_LEVEL: 0

## 2025-04-18 NOTE — ANESTHESIA POSTPROCEDURE EVALUATION
Patient: Calvin Figueroa    Procedure Summary       Date: 04/18/25 Room / Location: [facility]    Anesthesia Start: 1202 Anesthesia Stop: 1236    Procedure: COLONOSCOPY Diagnosis:       Colon cancer screening      Polyp of colon, unspecified part of colon, unspecified type    Scheduled Providers: Yoesph Cortez MD PhD; Carloz Mckeon MD Responsible Provider: Carloz Mckeon MD    Anesthesia Type: MAC ASA Status: 3            Anesthesia Type: MAC    Vitals Value Taken Time   /73 04/18/25 12:36   Temp 36 °C (96.8 °F) 04/18/25 12:36   Pulse 70 04/18/25 12:36   Resp 18 04/18/25 12:36   SpO2 98 % 04/18/25 12:36       Anesthesia Post Evaluation    Patient location during evaluation: PACU  Patient participation: complete - patient participated  Level of consciousness: awake  Pain score: 0  Pain management: adequate  Airway patency: patent  Cardiovascular status: acceptable  Respiratory status: acceptable  Hydration status: acceptable  Postoperative Nausea and Vomiting: none        There were no known notable events for this encounter.

## 2025-04-18 NOTE — H&P
History Of Present Illness  Calvin Figueroa is a 70 y.o. male presenting for screening colonoscopy. Last colonoscopy was in 2019, had tubular adenoma in transverse colon and hyperplastic polyp in rectum. Feels well this morning. Has had mild diarrhea since changing his BP meds in Jan, otherwise no GI symptoms or changes in bowel habits. No recent changes to medical history.     Past Medical History  Medical History[1]    Surgical History  Surgical History[2]     Social History  He reports that he has never smoked. He has never used smokeless tobacco. He reports that he does not drink alcohol and does not use drugs.    Family History  Family History[3]     Allergies  Patient has no known allergies.    Review of Systems   All other systems reviewed and are negative.       Physical Exam  Constitutional:       General: He is not in acute distress.  HENT:      Head: Normocephalic and atraumatic.      Mouth/Throat:      Mouth: Mucous membranes are moist.   Eyes:      Extraocular Movements: Extraocular movements intact.   Cardiovascular:      Rate and Rhythm: Normal rate.   Pulmonary:      Effort: No respiratory distress.   Abdominal:      General: There is no distension.   Musculoskeletal:         General: Normal range of motion.   Skin:     General: Skin is warm and dry.   Neurological:      General: No focal deficit present.      Mental Status: He is alert.          Last Recorded Vitals  Blood pressure 138/87, pulse 77, temperature 36.3 °C (97.3 °F), temperature source Temporal, resp. rate 18, weight 93 kg (205 lb 0.4 oz), SpO2 95%.    Relevant Results        Assessment & Plan  Colon cancer screening    Polyp of colon, unspecified part of colon, unspecified type      Proceed with screening colonoscopy. Consent obtained in preop.         Torie Fitch MD         [1]   Past Medical History:  Diagnosis Date    Chronic fatigue, unspecified 05/12/2022    Chronic fatigue    Diabetes mellitus (Multi)     High cholesterol      Hypothyroidism, unspecified 05/18/2022    Hypothyroidism    Personal history of other diseases of the circulatory system     History of hypertension    Personal history of other diseases of the digestive system 01/22/2016    History of gastroenteritis    Unspecified hearing loss, unspecified ear 01/28/2022    Decreased hearing   [2]   Past Surgical History:  Procedure Laterality Date    CT ANGIO NECK  3/9/2022    CT NECK ANGIO W AND WO IV CONTRAST 3/9/2022 [facility]    CT ANGIO NECK  10/21/2022    CT NECK ANGIO W AND WO IV CONTRAST 10/21/2022 [facility]    CT HEAD ANGIO W AND WO IV CONTRAST  3/9/2022    CT HEAD ANGIO W AND WO IV CONTRAST 3/9/2022 [facility]    CT HEAD ANGIO W AND WO IV CONTRAST  10/21/2022    CT HEAD ANGIO W AND WO IV CONTRAST 10/21/2022 [facility]    MR HEAD ANGIO WO IV CONTRAST  3/9/2022    MR HEAD ANGIO WO IV CONTRAST 3/9/2022 [facility]    MR NECK ANGIO WO IV CONTRAST  3/9/2022    MR NECK ANGIO WO IV CONTRAST 3/9/2022 [facility]   [3]   Family History  Problem Relation Name Age of Onset    Heart disease Mother      Diabetes Mother      Hypertension Mother      Hyperlipidemia Father      Cancer Father      Hypertension Father      Rectal cancer Paternal Grandmother      Other (Cardiac disorder) Other Other     Diabetes Other Other     Hypertension Other Other     Lung cancer Other Other

## 2025-04-18 NOTE — ANESTHESIA PREPROCEDURE EVALUATION
Patient: Calvin Figueroa    Procedure Information       Date/Time: 04/18/25 1210    Scheduled providers: Yoseph Cortez MD PhD; Carloz Mckeon MD    Procedure: COLONOSCOPY    Location: [facility]            Relevant Problems   Anesthesia   (-) Difficult intubation   (-) PONV (postoperative nausea and vomiting)      Cardiac   (+) 3-vessel CAD   (+) Benign essential hypertension   (+) HTN (hypertension)   (+) Hyperlipidemia      Pulmonary   (+) Exertional dyspnea      Neuro   (+) Arteriosclerosis of carotid artery, bilateral   (+) Carotid artery disease      GI   (+) Crohns disease of small intestine (Multi)   (+) GERD (gastroesophageal reflux disease)      Endocrine   (+) Class 1 obesity with body mass index (BMI) of 32.0 to 32.9 in adult   (+) DM (diabetes mellitus), type 2 (Multi)   (+) Diabetic neuropathy (Multi)   (+) Hypothyroidism      Musculoskeletal   (+) Osteoarthritis      HEENT   (+) Hearing loss, sensorineural, asymmetrical      Skin   (+) Eczema       Clinical information reviewed:    Allergies                NPO Detail:  NPO/Void Status  Date of Last Liquid: 04/17/25  Time of Last Liquid: 2200  Date of Last Solid: 04/16/25  Time of Last Solid: 1800         Physical Exam    Airway  Mallampati: II  TM distance: >3 FB  Neck ROM: full     Cardiovascular - normal exam  Rhythm: regular  Rate: normal     Dental    Pulmonary - normal exam   Abdominal            Anesthesia Plan    History of general anesthesia?: yes  History of complications of general anesthesia?: no    ASA 3     MAC     The patient is not a current smoker.  Education provided regarding risk of obstructive sleep apnea.  intravenous induction   Anesthetic plan and risks discussed with patient.    Plan discussed with CRNA, CAA and attending.

## 2025-04-29 DIAGNOSIS — M19.90 OSTEOARTHRITIS, UNSPECIFIED OSTEOARTHRITIS TYPE, UNSPECIFIED SITE: ICD-10-CM

## 2025-04-30 RX ORDER — MELOXICAM 15 MG/1
15 TABLET ORAL DAILY
Qty: 90 TABLET | Refills: 0 | Status: SHIPPED | OUTPATIENT
Start: 2025-04-30

## 2025-05-13 ENCOUNTER — HOSPITAL ENCOUNTER (OUTPATIENT)
Dept: VASCULAR MEDICINE | Facility: HOSPITAL | Age: 70
Discharge: HOME | End: 2025-05-13
Payer: COMMERCIAL

## 2025-05-13 DIAGNOSIS — I65.22 OCCLUSION AND STENOSIS OF LEFT CAROTID ARTERY: ICD-10-CM

## 2025-05-13 DIAGNOSIS — I65.23 BILATERAL CAROTID ARTERY STENOSIS: ICD-10-CM

## 2025-05-13 PROCEDURE — 93880 EXTRACRANIAL BILAT STUDY: CPT

## 2025-05-13 PROCEDURE — 93880 EXTRACRANIAL BILAT STUDY: CPT | Performed by: SURGERY

## 2025-05-15 ENCOUNTER — TELEPHONE (OUTPATIENT)
Dept: PRIMARY CARE | Facility: CLINIC | Age: 70
End: 2025-05-15
Payer: COMMERCIAL

## 2025-05-15 NOTE — TELEPHONE ENCOUNTER
Patient walked in today and states he has a F/U appointment on  05-21-25 and wanted to get blood before his appointment. States you do blood work every 6 months on him but I do not see any lab orders for him. What do you advise?

## 2025-05-16 DIAGNOSIS — E11.59 TYPE 2 DIABETES MELLITUS WITH OTHER CIRCULATORY COMPLICATION, WITHOUT LONG-TERM CURRENT USE OF INSULIN: ICD-10-CM

## 2025-05-16 DIAGNOSIS — R35.1 NOCTURIA: Primary | ICD-10-CM

## 2025-05-16 NOTE — TELEPHONE ENCOUNTER
Following up on putting blood work orders in before his appt. He would like a call once they are in which I will follow up on once in for pt.

## 2025-05-20 ENCOUNTER — APPOINTMENT (OUTPATIENT)
Dept: VASCULAR SURGERY | Facility: CLINIC | Age: 70
End: 2025-05-20
Payer: COMMERCIAL

## 2025-05-20 VITALS
HEART RATE: 71 BPM | HEIGHT: 68 IN | BODY MASS INDEX: 33.49 KG/M2 | DIASTOLIC BLOOD PRESSURE: 76 MMHG | SYSTOLIC BLOOD PRESSURE: 134 MMHG | OXYGEN SATURATION: 98 % | WEIGHT: 221 LBS

## 2025-05-20 DIAGNOSIS — I10 PRIMARY HYPERTENSION: ICD-10-CM

## 2025-05-20 DIAGNOSIS — I77.9 CAROTID ARTERY DISEASE: Primary | ICD-10-CM

## 2025-05-20 DIAGNOSIS — E11.9 TYPE 2 DIABETES MELLITUS WITHOUT COMPLICATION, WITHOUT LONG-TERM CURRENT USE OF INSULIN: ICD-10-CM

## 2025-05-20 DIAGNOSIS — E78.2 MIXED HYPERLIPIDEMIA: ICD-10-CM

## 2025-05-20 LAB
ALBUMIN SERPL-MCNC: 4.5 G/DL (ref 3.6–5.1)
ALBUMIN/CREAT UR: 2 MG/G CREAT
ALP SERPL-CCNC: 58 U/L (ref 35–144)
ALT SERPL-CCNC: 30 U/L (ref 9–46)
ANION GAP SERPL CALCULATED.4IONS-SCNC: 9 MMOL/L (CALC) (ref 7–17)
AST SERPL-CCNC: 29 U/L (ref 10–35)
BILIRUB SERPL-MCNC: 0.5 MG/DL (ref 0.2–1.2)
BUN SERPL-MCNC: 19 MG/DL (ref 7–25)
CALCIUM SERPL-MCNC: 9.1 MG/DL (ref 8.6–10.3)
CHLORIDE SERPL-SCNC: 104 MMOL/L (ref 98–110)
CHOLEST SERPL-MCNC: 167 MG/DL
CHOLEST/HDLC SERPL: 4.5 (CALC)
CO2 SERPL-SCNC: 26 MMOL/L (ref 20–32)
CREAT SERPL-MCNC: 1.09 MG/DL (ref 0.7–1.35)
CREAT UR-MCNC: 143 MG/DL (ref 20–320)
EGFRCR SERPLBLD CKD-EPI 2021: 74 ML/MIN/1.73M2
EST. AVERAGE GLUCOSE BLD GHB EST-MCNC: 143 MG/DL
EST. AVERAGE GLUCOSE BLD GHB EST-SCNC: 7.9 MMOL/L
GLUCOSE SERPL-MCNC: 127 MG/DL (ref 65–99)
HBA1C MFR BLD: 6.6 %
HDLC SERPL-MCNC: 37 MG/DL
LDLC SERPL CALC-MCNC: 90 MG/DL (CALC)
MICROALBUMIN UR-MCNC: 0.3 MG/DL
NONHDLC SERPL-MCNC: 130 MG/DL (CALC)
POTASSIUM SERPL-SCNC: 4.4 MMOL/L (ref 3.5–5.3)
PROT SERPL-MCNC: 6.8 G/DL (ref 6.1–8.1)
PSA SERPL-MCNC: 1.3 NG/ML
SODIUM SERPL-SCNC: 139 MMOL/L (ref 135–146)
TRIGL SERPL-MCNC: 278 MG/DL
TSH SERPL-ACNC: 3.8 MIU/L (ref 0.4–4.5)

## 2025-05-20 PROCEDURE — 1123F ACP DISCUSS/DSCN MKR DOCD: CPT | Performed by: SURGERY

## 2025-05-20 PROCEDURE — 99214 OFFICE O/P EST MOD 30 MIN: CPT | Performed by: SURGERY

## 2025-05-20 PROCEDURE — 3008F BODY MASS INDEX DOCD: CPT | Performed by: SURGERY

## 2025-05-20 PROCEDURE — 4010F ACE/ARB THERAPY RXD/TAKEN: CPT | Performed by: SURGERY

## 2025-05-20 PROCEDURE — 1036F TOBACCO NON-USER: CPT | Performed by: SURGERY

## 2025-05-20 PROCEDURE — 3075F SYST BP GE 130 - 139MM HG: CPT | Performed by: SURGERY

## 2025-05-20 PROCEDURE — 3078F DIAST BP <80 MM HG: CPT | Performed by: SURGERY

## 2025-05-20 PROCEDURE — 1159F MED LIST DOCD IN RCRD: CPT | Performed by: SURGERY

## 2025-05-20 PROCEDURE — 1160F RVW MEDS BY RX/DR IN RCRD: CPT | Performed by: SURGERY

## 2025-05-20 RX ORDER — TRIAMTERENE AND HYDROCHLOROTHIAZIDE 37.5; 25 MG/1; MG/1
1 CAPSULE ORAL
COMMUNITY
Start: 2025-04-14

## 2025-05-20 ASSESSMENT — ENCOUNTER SYMPTOMS
RESPIRATORY NEGATIVE: 1
NEUROLOGICAL NEGATIVE: 1
MUSCULOSKELETAL NEGATIVE: 1
GASTROINTESTINAL NEGATIVE: 1
PSYCHIATRIC NEGATIVE: 1
CONSTITUTIONAL NEGATIVE: 1
HEMATOLOGIC/LYMPHATIC NEGATIVE: 1
ENDOCRINE NEGATIVE: 1
CARDIOVASCULAR NEGATIVE: 1
ALLERGIC/IMMUNOLOGIC NEGATIVE: 1

## 2025-05-20 NOTE — PROGRESS NOTES
History Of Present Illness  Calvin Figueroa is a 70 y.o. male presenting for carotid follow-up. He has history of left carotid endarterectomy in March 2022. His last visit to the vascular office was 1 year ago. He denies any new lateralizing symptoms since that time. He has no complaints.  He continues to take aspirin and atorvastatin daily.  Recent carotid duplex reveals less than 50% ICA stenosis bilaterally.      Past Medical History  He has a past medical history of Chronic fatigue, unspecified (05/12/2022), Diabetes mellitus (Multi), High cholesterol, Hypothyroidism, unspecified (05/18/2022), Personal history of other diseases of the circulatory system, Personal history of other diseases of the digestive system (01/22/2016), and Unspecified hearing loss, unspecified ear (01/28/2022).    Surgical History  He has a past surgical history that includes MR angio head wo IV contrast (3/9/2022); MR angio neck wo IV contrast (3/9/2022); CT angio head w and wo IV contrast (3/9/2022); CT angio neck (3/9/2022); CT angio head w and wo IV contrast (10/21/2022); and CT angio neck (10/21/2022).     Social History  He reports that he has never smoked. He has never used smokeless tobacco. He reports that he does not drink alcohol and does not use drugs.    Family History  Family History[1]     Allergies  Patient has no known allergies.    Review of Systems   Constitutional: Negative.    HENT: Negative.     Respiratory: Negative.     Cardiovascular: Negative.    Gastrointestinal: Negative.    Endocrine: Negative.    Genitourinary: Negative.    Musculoskeletal: Negative.    Allergic/Immunologic: Negative.    Neurological: Negative.    Hematological: Negative.    Psychiatric/Behavioral: Negative.          Physical Exam  Vitals reviewed.   Constitutional:       General: He is not in acute distress.     Appearance: Normal appearance. He is normal weight.   HENT:      Head: Normocephalic and atraumatic.   Eyes:      Extraocular  "Movements: Extraocular movements intact.      Conjunctiva/sclera: Conjunctivae normal.   Neck:      Vascular: No carotid bruit.   Cardiovascular:      Rate and Rhythm: Normal rate and regular rhythm.      Pulses:           Radial pulses are 2+ on the right side and 2+ on the left side.      Heart sounds: Normal heart sounds.   Pulmonary:      Effort: Pulmonary effort is normal.      Breath sounds: Normal breath sounds.   Abdominal:      General: Abdomen is flat.      Palpations: Abdomen is soft.   Musculoskeletal:         General: No swelling or tenderness. Normal range of motion.      Cervical back: Normal range of motion and neck supple. No tenderness.   Skin:     General: Skin is warm and dry.      Capillary Refill: Capillary refill takes less than 2 seconds.   Neurological:      General: No focal deficit present.      Mental Status: He is alert and oriented to person, place, and time.      Cranial Nerves: Cranial nerves 2-12 are intact. No cranial nerve deficit.      Sensory: No sensory deficit.      Motor: Motor function is intact. No weakness.   Psychiatric:         Mood and Affect: Mood normal.         Behavior: Behavior normal.          Last Recorded Vitals  Blood pressure 134/76, pulse 71, height 1.727 m (5' 8\"), weight 100 kg (221 lb), SpO2 98%.    Relevant Results    Current Medications[2]        Vascular US carotid artery duplex bilateral  Result Date: 5/13/2025           [place] [address] Tel [phone] and Fax [phone]  Vascular Lab Report [facility] US CAROTID ARTERY DUPLEX BILATERAL  Patient Name:      FABIEN JACKSON GONZALO Hook Physician:  [id number] Matson MD Study Date:        5/13/2025            Ordering Physician: Chika MATSON MRN/PID:           [id number]             Technologist:       Mita Batista T Accession#:   "      [id number]         Technologist 2: Date of Birth/Age: 1955 / 70 years Encounter#:         [id number] Gender:            M Admission Status:  Outpatient           Location Performed: [facility]  Diagnosis/ICD: Occlusion and stenosis of left carotid artery-I65.22 Indication:    Carotid Occlusion/Stenosis w/o infarct CPT Codes:     46300 Cerebrovascular Carotid Duplex scan complete  CONCLUSIONS: Right Carotid: Findings are consistent with less than 50% stenosis of the right proximal internal carotid artery. Laminar flow seen by color Doppler. Right external carotid artery appears patent with no evidence of stenosis. No evidence of hemodynamically significant stenosis of the right common carotid artery. The right vertebral artery is patent with antegrade flow. No evidence of hemodynamically significant stenosis in the right subclavian artery. Left Carotid: Findings are consistent with less than 50% stenosis of the left proximal internal carotid artery. Laminar flow seen by color Doppler. There are elevated velocities in the left ECA that are suggestive of disease. No evidence of hemodynamically significant stenosis of the left common carotid artery. The left vertebral artery demonstrates a high resistance waveform which may be suggestive of a more distal stenosis or occlusion. No evidence of hemodynamically significant stenosis in the left subclavian artery. Endarterectomy: Patent left carotid endarterectomy site following endarterectomy.  Comparison: Compared with study from 5/9/2024, Increased velocities within the left ECA. The left Vertebral artery demonstrates high resisitant waveforms currently.  Imaging & Doppler Findings: Right Plaque Morph: The proximal right internal carotid artery demonstrates smooth and homogenous plaque. The proximal right external carotid artery demonstrates smooth and homogenous plaque. The mid right common carotid artery demonstrates smooth and homogenous plaque.  The distal right common carotid artery demonstrates smooth and homogenous plaque. Left Plaque Morph: The proximal left internal carotid artery demonstrates smooth and homogenous plaque.   Right                        Left   PSV      EDV                PSV       cm/s           CCA P    81 cm/s 77 cm/s            CCA D    75 cm/s 61 cm/s  21 cm/s   ICA P    53 cm/s  23 cm/s 70 cm/s  33 cm/s   ICA M    89 cm/s  42 cm/s 69 cm/s  32 cm/s   ICA D    91 cm/s  38 cm/s 83 cm/s             ECA     255 cm/s 47 cm/s  16 cm/s Vertebral  41 cm/s  11 cm/s 112 cm/s         Subclavian 83 cm/s               Right Left ICA/CCA Ratio  0.8  0.7   [id number] Barbara Nuñez MD Electronically signed by [id number] Barbara Nuñez MD on 5/13/2025 at 3:56:58 PM  ** Final **           Assessment/Plan   Diagnoses and all orders for this visit:  Carotid artery disease  -     Vascular US Carotid Artery Duplex Bilateral; Future      71yo male with known carotid disease and history of left CEA in March 2022.  Currently he denies any lateralizing symptoms and no focal deficits are noted on exam.  Recent carotid duplex reveals less than 50% ICA stenosis bilaterally.  I would not recommend any further intervention at this time.  He should continue medical management with aspirin and atorvastatin.  He is to follow-up in 1 year with repeat carotid duplex.        (This note was generated with voice recognition software and may contain errors including spelling, grammar, syntax and missed recognition of what was dictated, of which may not have been fully corrected)        Barbara Nuñez MD        [1]   Family History  Problem Relation Name Age of Onset    Heart disease Mother      Diabetes Mother      Hypertension Mother      Hyperlipidemia Father      Cancer Father      Hypertension Father      Rectal cancer Paternal Grandmother      Other (Cardiac disorder) Other Other     Diabetes Other Other     Hypertension Other Other     Lung cancer Other  Other    [2]   Current Outpatient Medications:     triamterene-hydrochlorothiazid (Dyazide) 37.5-25 mg capsule, Take 1 capsule by mouth early in the morning.., Disp: , Rfl:     aspirin 81 mg EC tablet, Take 1 tablet (81 mg) by mouth once daily., Disp: , Rfl:     atorvastatin (Lipitor) 80 mg tablet, TAKE 1 TABLET BY MOUTH ONCE  DAILY, Disp: 90 tablet, Rfl: 1    blood sugar diagnostic (Blood Glucose Test) strip, [employer] -uses there test strips and lancets and meter., Disp: , Rfl:     chlorthalidone (Hygroton) 25 mg tablet, TAKE 1 TABLET BY MOUTH ONCE  DAILY, Disp: 90 tablet, Rfl: 1    cholecalciferol (Vitamin D-3) 50 MCG (2000 UT) tablet, Take by mouth., Disp: , Rfl:     glucosamine-chondroitin (Osteo Bi-Flex) 250-200 mg tablet, Take 1 tablet by mouth twice a day., Disp: , Rfl:     lancets misc, [employer] uses there test strips and lancets and meter., Disp: , Rfl:     lecithin, soy 1,200 mg capsule, Take 1 capsule by mouth once daily., Disp: , Rfl:     meclizine (Antivert) 12.5 mg tablet, Take 1 tablet (12.5 mg) by mouth 3 times a day as needed for dizziness., Disp: 30 tablet, Rfl: 0    meloxicam (Mobic) 15 mg tablet, TAKE 1 TABLET BY MOUTH ONCE  DAILY, Disp: 90 tablet, Rfl: 0    metFORMIN (Glucophage) 1,000 mg tablet, TAKE 1 TABLET BY MOUTH TWICE  DAILY, Disp: 180 tablet, Rfl: 1    multivit-minerals/folic acid (ONE-A-DAY MEN VITACRAVES ORAL), Take 1 tablet by mouth once daily. One A Day Mens Vitacraves Multi Gummies, Disp: , Rfl:     omega 3-dha-epa-fish oil (Sea-Omega 30) 1,200 (144-216) mg capsule, Take 1 capsule (1,200 mg) by mouth twice a day., Disp: , Rfl:     omeprazole (PriLOSEC) 40 mg DR capsule, TAKE 1 CAPSULE BY MOUTH ONCE  DAILY IN THE MORNING TAKE BEFORE MEALS, Disp: 90 capsule, Rfl: 3    Ozempic 0.25 mg or 0.5 mg (2 mg/3 mL) pen injector, INJECT SUBCUTANEOUSLY 0.5 MG  EVERY WEEK, Disp: 3 mL, Rfl: 11    telmisartan (MIcarDIS) 80 mg tablet, TAKE 1 TABLET BY MOUTH ONCE  DAILY, Disp: 90 tablet, Rfl: 1     tiZANidine (Zanaflex) 2 mg tablet, Take 1 tablet (2 mg) by mouth every 6 hours if needed for muscle spasms for up to 10 days., Disp: 30 tablet, Rfl: 0

## 2025-05-21 ENCOUNTER — APPOINTMENT (OUTPATIENT)
Dept: PRIMARY CARE | Facility: CLINIC | Age: 70
End: 2025-05-21
Payer: COMMERCIAL

## 2025-05-21 VITALS
OXYGEN SATURATION: 91 % | DIASTOLIC BLOOD PRESSURE: 83 MMHG | SYSTOLIC BLOOD PRESSURE: 136 MMHG | TEMPERATURE: 98 F | BODY MASS INDEX: 33.57 KG/M2 | HEART RATE: 79 BPM | HEIGHT: 68 IN | WEIGHT: 221.5 LBS

## 2025-05-21 DIAGNOSIS — E78.2 MIXED HYPERLIPIDEMIA: ICD-10-CM

## 2025-05-21 DIAGNOSIS — E11.59 TYPE 2 DIABETES MELLITUS WITH OTHER CIRCULATORY COMPLICATION, WITHOUT LONG-TERM CURRENT USE OF INSULIN: Primary | ICD-10-CM

## 2025-05-21 DIAGNOSIS — K21.9 GASTROESOPHAGEAL REFLUX DISEASE WITHOUT ESOPHAGITIS: ICD-10-CM

## 2025-05-21 DIAGNOSIS — I10 PRIMARY HYPERTENSION: ICD-10-CM

## 2025-05-21 RX ORDER — ATORVASTATIN CALCIUM 80 MG/1
80 TABLET, FILM COATED ORAL DAILY
Qty: 90 TABLET | Refills: 1 | Status: SHIPPED | OUTPATIENT
Start: 2025-05-21

## 2025-05-21 RX ORDER — TELMISARTAN 80 MG/1
80 TABLET ORAL DAILY
Qty: 90 TABLET | Refills: 1 | Status: SHIPPED | OUTPATIENT
Start: 2025-05-21

## 2025-05-21 RX ORDER — CHLORTHALIDONE 25 MG/1
25 TABLET ORAL DAILY
Qty: 90 TABLET | Refills: 1 | Status: SHIPPED | OUTPATIENT
Start: 2025-05-21 | End: 2025-05-21 | Stop reason: WASHOUT

## 2025-05-21 RX ORDER — METFORMIN HYDROCHLORIDE 1000 MG/1
1000 TABLET ORAL 2 TIMES DAILY
Qty: 180 TABLET | Refills: 1 | Status: SHIPPED | OUTPATIENT
Start: 2025-05-21

## 2025-05-21 ASSESSMENT — PATIENT HEALTH QUESTIONNAIRE - PHQ9
2. FEELING DOWN, DEPRESSED OR HOPELESS: NOT AT ALL
1. LITTLE INTEREST OR PLEASURE IN DOING THINGS: NOT AT ALL
SUM OF ALL RESPONSES TO PHQ9 QUESTIONS 1 & 2: 0

## 2025-07-06 DIAGNOSIS — M19.90 OSTEOARTHRITIS, UNSPECIFIED OSTEOARTHRITIS TYPE, UNSPECIFIED SITE: ICD-10-CM

## 2025-07-07 RX ORDER — MELOXICAM 15 MG/1
15 TABLET ORAL DAILY
Qty: 90 TABLET | Refills: 0 | Status: SHIPPED | OUTPATIENT
Start: 2025-07-07

## 2025-07-14 DIAGNOSIS — E11.59 TYPE 2 DIABETES MELLITUS WITH OTHER CIRCULATORY COMPLICATION, WITHOUT LONG-TERM CURRENT USE OF INSULIN: Primary | ICD-10-CM

## 2025-11-21 ENCOUNTER — APPOINTMENT (OUTPATIENT)
Dept: PRIMARY CARE | Facility: CLINIC | Age: 70
End: 2025-11-21
Payer: COMMERCIAL

## 2026-05-26 ENCOUNTER — APPOINTMENT (OUTPATIENT)
Dept: VASCULAR SURGERY | Facility: CLINIC | Age: 71
End: 2026-05-26
Payer: COMMERCIAL